# Patient Record
Sex: FEMALE | Race: WHITE | NOT HISPANIC OR LATINO | Employment: PART TIME | URBAN - METROPOLITAN AREA
[De-identification: names, ages, dates, MRNs, and addresses within clinical notes are randomized per-mention and may not be internally consistent; named-entity substitution may affect disease eponyms.]

---

## 2017-01-26 ENCOUNTER — OFFICE VISIT (OUTPATIENT)
Dept: FAMILY MEDICINE | Facility: CLINIC | Age: 57
End: 2017-01-26
Payer: COMMERCIAL

## 2017-01-26 VITALS
SYSTOLIC BLOOD PRESSURE: 122 MMHG | HEART RATE: 55 BPM | TEMPERATURE: 97.9 F | OXYGEN SATURATION: 99 % | BODY MASS INDEX: 25.07 KG/M2 | WEIGHT: 136.25 LBS | DIASTOLIC BLOOD PRESSURE: 72 MMHG | HEIGHT: 62 IN

## 2017-01-26 DIAGNOSIS — M75.42 IMPINGEMENT SYNDROME OF LEFT SHOULDER: Primary | ICD-10-CM

## 2017-01-26 PROCEDURE — 20610 DRAIN/INJ JOINT/BURSA W/O US: CPT | Mod: LT | Performed by: PHYSICIAN ASSISTANT

## 2017-01-26 PROCEDURE — 99213 OFFICE O/P EST LOW 20 MIN: CPT | Mod: 25 | Performed by: PHYSICIAN ASSISTANT

## 2017-01-26 RX ORDER — TRIAMCINOLONE ACETONIDE 40 MG/ML
80 INJECTION, SUSPENSION INTRA-ARTICULAR; INTRAMUSCULAR ONCE
Qty: 2 ML | Refills: 0 | OUTPATIENT
Start: 2017-01-26 | End: 2017-01-26

## 2017-01-26 NOTE — PROGRESS NOTES
SUBJECTIVE:                                                    Tawana Watson is a 56 year old female who presents to clinic today for the following health issues:    Should Pain  Patient presents to clinic today with chief complaint of left shoulder pain and possible injection. She has PMH significant for bilateral shoulder pain. Last seen for right frozen shoulder pain on 3/31/16 and she received an injection at this time. Today she complains onset of left shoulder pain was in mid-2016. Describes pain on lateral and anterior left shoulder. She feels that pain is similar to pain she felt in last March. Has followed with PT in the past and for current pain has continued to do at home exercises to improve shoulder strength.       Problem list and histories reviewed & adjusted, as indicated.  Additional history: as documented    Patient Active Problem List   Diagnosis     CARDIOVASCULAR SCREENING; LDL GOAL LESS THAN 160     Diverticulosis of colon     Advanced directives, counseling/discussion     Goiter     Family history of pancreatic cancer     Past Surgical History   Procedure Laterality Date     Surgical history of -        Esophagram - difficulty swallowing      section  87, 90     Phlebectomy multiple stab Left 10/15/96     varicosity - painful     Tubal ligation       s/p TL     Hc reduction of large breast  2006     Hysterectomy, supracervical laparoscopic       fibroid - ovaries spared     Hc colonoscopy thru stoma, diagnostic  2010     no polyps repeat 10       Social History   Substance Use Topics     Smoking status: Former Smoker -- 1.50 packs/day for 10 years     Types: Cigarettes     Quit date: 01/15/1986     Smokeless tobacco: Not on file     Alcohol Use: Yes      Comment: 1-2 beers a week     Family History   Problem Relation Age of Onset     Pancreatic Cancer Mother      pancreatic, +67     Breast Cancer No family hx of      Cancer - colorectal No family  "hx of      C.A.D. No family hx of      DIABETES Father       at 40     Hypertension Mother          Current Outpatient Prescriptions   Medication Sig Dispense Refill     IBUPROFEN PO Take 200 mg by mouth 4 times daily       No Known Allergies    ROS:  Constitutional, HEENT, cardiovascular, pulmonary, GI, , musculoskeletal, neuro, skin, endocrine and psych systems are negative, except as otherwise noted.    This document serves as a record of the services and decisions personally performed and made by Oksana Rubalcava PA-C. It was created on her behalf by Shoshana Schwartz, a trained medical scribe. The creation of this document is based the provider's statements to the medical scribe.  Shoshana Schwartz, 2017 11:32 AM    OBJECTIVE:                                                    /72 mmHg  Pulse 55  Temp(Src) 97.9  F (36.6  C) (Tympanic)  Ht 5' 1.75\" (1.568 m)  Wt 136 lb 4 oz (61.803 kg)  BMI 25.14 kg/m2  SpO2 99%  LMP 2008  Body mass index is 25.14 kg/(m^2).     GENERAL: healthy, alert and no distress  RESP: lungs clear to auscultation - no rales, rhonchi or wheezes  CV: regular rate and rhythm, normal S1 S2, no S3 or S4, no murmur, click or rub, no peripheral edema and peripheral pulses strong  MS: lacking 5 degrees of forward flexion on left, lacking 15 degrees of external rotation on left, lacking 5 degrees internal rotation on left, positive webb-jr on left, 5/5 strength throughout bilaterally  NEURO: Normal strength and tone, mentation intact and speech normal  PSYCH: mentation appears normal, affect normal/bright    Procedure:  After discussing the risks, benefits and alternatives to a left subacromial cortisone injection the patient elected to proceed with the injection.  The posterior aspect of the left shoulder was prepped with a betadine solution.  Using a sterile technique and a 22 gauge needle, 4 cc's of 1% lidocaine, 4 cc's of 0.25% marcaine, and 80 mg in Kenalog " were injected into the left subacromial.  The patient tolerated the procedure well and there were no immediate adverse effects.        Diagnostic Test Results:  none      ASSESSMENT/PLAN:                                                    Tawana was seen today for shoulder pain.    Diagnoses and all orders for this visit:    Impingement syndrome of left shoulder  Patient stable and doing well after injection. Counseled patient on at home exercises and provided handout for patient to improve shoulder strength. Discussed with patient that if pain returns further imaging will likely be necessary.   -     Large Joint/Bursa injection and/or drainage (Shoulder, Knee)  -     Kenalog  80 MG         []  -     triamcinolone acetonide (KENALOG) 40 MG/ML injection; 2 mLs (80 mg) by INTRA-ARTICULAR route once for 1 dose    The information in this document, created by the medical scribe for me, accurately reflects the services I personally performed and the decisions made by me. I have reviewed and approved this document for accuracy prior to leaving the patient care area.  Oksana Rubalcava PA-C January 26, 2017 11:31 AM    Oksana Rubalcava PA-C  [facility]

## 2017-01-26 NOTE — PATIENT INSTRUCTIONS
Shoulder Bursitis         What is shoulder bursitis?   Shoulder bursitis is an irritation or inflammation of the bursa in your shoulder. A bursa is a fluid-filled sac that acts as a cushion between tendons, bones, and skin.   How does it occur?   The shoulder bursa can get inflamed from repetitive motion of the shoulder. Shoulder bursitis often occurs in sports with overhead activities such as swimming, tennis, or throwing. It may also occur if you do carpentry work or painting.   What are the symptoms?   Symptoms include pain on the outer front side of your shoulder. Your shoulder may hurt when you lift your arm above your head. The outer side of your shoulder may become swollen and may at times feel warm.   How is it diagnosed?   Your healthcare provider will review your symptoms and examine your shoulder.   How is it treated?   To treat this condition:   Put an ice pack, gel pack, or package of frozen vegetables, wrapped in a cloth on the area every 3 to 4 hours, for up to 20 minutes at a time.   Take an anti-inflammatory such as ibuprofen, or other medicine as directed by your provider. Nonsteroidal anti-inflammatory medicines (NSAIDs) may cause stomach bleeding and other problems. These risks increase with age. Read the label and take as directed. Unless recommended by your healthcare provider, do not take for more than 10 days.   Your provider may give you a shot of a corticosteroid medicine into your shoulder bursa to help with pain and swelling   Follow your provider's instructions for doing exercises to help you recover.   How long will the effects last?   The length of recovery depends on your age, health, and if you have had a previous injury. A bursa that is only mildly inflamed and has just started to hurt may improve within a few weeks. A bursa that is significantly inflamed and has been painful for a long time may take up to a few months to improve. You need to stop doing the activities  that cause pain until your shoulder has healed. If you continue doing activities that cause pain, your symptoms will return and it will take longer to recover.   When can I return to my normal activities?   Everyone recovers from an injury at a different rate. Return to your activities will be determined by how soon your shoulder recovers, not by how many days or weeks it has been since your injury has occurred. In general, the longer you have symptoms before you start treatment, the longer it will take to get better. The goal is to return to your normal activities as soon as is safely possible. If you return too soon you may worsen your injury.   You may safely return to your activities when:   Your injured shoulder has full range of motion without pain.   Your injured shoulder has regained normal strength compared to the uninjured shoulder.   How can I prevent shoulder bursitis?   Be sure to warm up properly and stretch your shoulder before such activities as throwing, playing tennis, or swimming. If your shoulder starts to hurt during these activities, you may need to slow down until the pain goes away.        Published by [employer].  This content is reviewed periodically and is subject to change as new health information becomes available. The information is intended to inform and educate and is not a replacement for medical evaluation, advice, diagnosis or treatment by a healthcare professional.   Written by Chino Ackerman MD, for [employer].   ? 2010 [employer] and/or its affiliates. All Rights Reserved.   Copyright   [employer] 2011

## 2017-01-26 NOTE — MR AVS SNAPSHOT
After Visit Summary   1/26/2017    Tawana Watson    MRN: [id number]           Patient Information     Date Of Birth          1960        Visit Information        Provider Department      1/26/2017 11:20 AM Oksana Rubalcava PA-C [facility] [place]        Today's Diagnoses     Impingement syndrome of left shoulder    -  1       Care Instructions                  Shoulder Bursitis         What is shoulder bursitis?   Shoulder bursitis is an irritation or inflammation of the bursa in your shoulder. A bursa is a fluid-filled sac that acts as a cushion between tendons, bones, and skin.   How does it occur?   The shoulder bursa can get inflamed from repetitive motion of the shoulder. Shoulder bursitis often occurs in sports with overhead activities such as swimming, tennis, or throwing. It may also occur if you do carpentry work or painting.   What are the symptoms?   Symptoms include pain on the outer front side of your shoulder. Your shoulder may hurt when you lift your arm above your head. The outer side of your shoulder may become swollen and may at times feel warm.   How is it diagnosed?   Your healthcare provider will review your symptoms and examine your shoulder.   How is it treated?   To treat this condition:   Put an ice pack, gel pack, or package of frozen vegetables, wrapped in a cloth on the area every 3 to 4 hours, for up to 20 minutes at a time.   Take an anti-inflammatory such as ibuprofen, or other medicine as directed by your provider. Nonsteroidal anti-inflammatory medicines (NSAIDs) may cause stomach bleeding and other problems. These risks increase with age. Read the label and take as directed. Unless recommended by your healthcare provider, do not take for more than 10 days.   Your provider may give you a shot of a corticosteroid medicine into your shoulder bursa to help with pain and swelling   Follow your provider's instructions for doing exercises to help you  recover.   How long will the effects last?   The length of recovery depends on your age, health, and if you have had a previous injury. A bursa that is only mildly inflamed and has just started to hurt may improve within a few weeks. A bursa that is significantly inflamed and has been painful for a long time may take up to a few months to improve. You need to stop doing the activities that cause pain until your shoulder has healed. If you continue doing activities that cause pain, your symptoms will return and it will take longer to recover.   When can I return to my normal activities?   Everyone recovers from an injury at a different rate. Return to your activities will be determined by how soon your shoulder recovers, not by how many days or weeks it has been since your injury has occurred. In general, the longer you have symptoms before you start treatment, the longer it will take to get better. The goal is to return to your normal activities as soon as is safely possible. If you return too soon you may worsen your injury.   You may safely return to your activities when:   Your injured shoulder has full range of motion without pain.   Your injured shoulder has regained normal strength compared to the uninjured shoulder.   How can I prevent shoulder bursitis?   Be sure to warm up properly and stretch your shoulder before such activities as throwing, playing tennis, or swimming. If your shoulder starts to hurt during these activities, you may need to slow down until the pain goes away.        Published by [employer].  This content is reviewed periodically and is subject to change as new health information becomes available. The information is intended to inform and educate and is not a replacement for medical evaluation, advice, diagnosis or treatment by a healthcare professional.   Written by Chino Ackerman MD, for [employer].   ? 2010 [employer] and/or its affiliates. All Rights Reserved.   Copyright    "[employer] 2011              Follow-ups after your visit        Who to contact     If you have questions or need follow up information about today's clinic visit or your schedule please contact [facility] directly at [phone].  Normal or non-critical lab and imaging results will be communicated to you by MyChart, letter or phone within 4 business days after the clinic has received the results. If you do not hear from us within 7 days, please contact the clinic through [employer] or phone. If you have a critical or abnormal lab result, we will notify you by phone as soon as possible.  Submit refill requests through [employer] or call your pharmacy and they will forward the refill request to us. Please allow 3 business days for your refill to be completed.          Additional Information About Your Visit        [employer] Information     [employer] gives you secure access to your electronic health record. If you see a primary care provider, you can also send messages to your care team and make appointments. If you have questions, please call your primary care clinic.  If you do not have a primary care provider, please call [phone] and they will assist you.        Care EveryWhere ID     This is your Care EveryWhere ID. This could be used by other organizations to access your [place] medical records  [id number]        Your Vitals Were     Pulse Temperature Height BMI (Body Mass Index) Pulse Oximetry Last Period    55 97.9  F (36.6  C) (Tympanic) 5' 1.75\" (1.568 m) 25.14 kg/m2 99% 11/01/2008       Blood Pressure from Last 3 Encounters:   01/26/17 122/72   12/08/16 100/64   03/31/16 121/79    Weight from Last 3 Encounters:   01/26/17 136 lb 4 oz (61.803 kg)   12/08/16 132 lb 8 oz (60.102 kg)   03/31/16 160 lb (72.576 kg)              We Performed the Following     Kenalog  80 MG         []     Large Joint/Bursa injection and/or drainage (Shoulder, Knee)          Today's Medication " Changes          These changes are accurate as of: 1/26/17 11:38 AM.  If you have any questions, ask your nurse or doctor.               Start taking these medicines.        Dose/Directions    triamcinolone acetonide 40 MG/ML injection   Commonly known as:  KENALOG   Used for:  Impingement syndrome of left shoulder        Dose:  80 mg   2 mLs (80 mg) by INTRA-ARTICULAR route once for 1 dose   Quantity:  2 mL   Refills:  0            Where to get your medicines      Some of these will need a paper prescription and others can be bought over the counter.  Ask your nurse if you have questions.     You don't need a prescription for these medications    - triamcinolone acetonide 40 MG/ML injection             Primary Care Provider Office Phone # Fax #    Oksana Rubalcava PA-C [phone]       [address] [address]        Thank you!     Thank you for choosing [facility]  for your care. Our goal is always to provide you with excellent care. Hearing back from our patients is one way we can continue to improve our services. Please take a few minutes to complete the written survey that you may receive in the mail after your visit with us. Thank you!             Your Updated Medication List - Protect others around you: Learn how to safely use, store and throw away your medicines at www.disposemymeds.org.          This list is accurate as of: 1/26/17 11:38 AM.  Always use your most recent med list.                   Brand Name Dispense Instructions for use    IBUPROFEN PO      Take 200 mg by mouth 4 times daily       triamcinolone acetonide 40 MG/ML injection    KENALOG    2 mL    2 mLs (80 mg) by INTRA-ARTICULAR route once for 1 dose

## 2017-04-24 ENCOUNTER — MYC MEDICAL ADVICE (OUTPATIENT)
Dept: FAMILY MEDICINE | Facility: CLINIC | Age: 57
End: 2017-04-24

## 2017-04-24 DIAGNOSIS — G89.29 CHRONIC LEFT SHOULDER PAIN: Primary | ICD-10-CM

## 2017-04-24 DIAGNOSIS — M25.512 CHRONIC LEFT SHOULDER PAIN: Primary | ICD-10-CM

## 2017-04-25 NOTE — TELEPHONE ENCOUNTER
Please see My Chart message below and advise as appropriate.    Lary Harry RN  [employer]

## 2017-04-28 ENCOUNTER — TRANSFERRED RECORDS (OUTPATIENT)
Dept: HEALTH INFORMATION MANAGEMENT | Facility: CLINIC | Age: 57
End: 2017-04-28

## 2017-05-02 ENCOUNTER — OFFICE VISIT (OUTPATIENT)
Dept: FAMILY MEDICINE | Facility: CLINIC | Age: 57
End: 2017-05-02
Payer: COMMERCIAL

## 2017-05-02 VITALS
BODY MASS INDEX: 25.99 KG/M2 | WEIGHT: 141.25 LBS | HEIGHT: 62 IN | SYSTOLIC BLOOD PRESSURE: 112 MMHG | OXYGEN SATURATION: 99 % | TEMPERATURE: 98.9 F | DIASTOLIC BLOOD PRESSURE: 72 MMHG | HEART RATE: 82 BPM

## 2017-05-02 DIAGNOSIS — M75.22 BICEPS TENDONITIS ON LEFT: ICD-10-CM

## 2017-05-02 DIAGNOSIS — M75.02 ADHESIVE CAPSULITIS OF LEFT SHOULDER: Primary | ICD-10-CM

## 2017-05-02 DIAGNOSIS — M19.019 ACROMIOCLAVICULAR JOINT ARTHRITIS: ICD-10-CM

## 2017-05-02 DIAGNOSIS — M75.112 INCOMPLETE TEAR OF LEFT ROTATOR CUFF: ICD-10-CM

## 2017-05-02 PROCEDURE — 20610 DRAIN/INJ JOINT/BURSA W/O US: CPT | Mod: LT | Performed by: PHYSICIAN ASSISTANT

## 2017-05-02 PROCEDURE — 99213 OFFICE O/P EST LOW 20 MIN: CPT | Mod: 25 | Performed by: PHYSICIAN ASSISTANT

## 2017-05-02 RX ORDER — TRIAMCINOLONE ACETONIDE 40 MG/ML
80 INJECTION, SUSPENSION INTRA-ARTICULAR; INTRAMUSCULAR ONCE
Qty: 2 ML | Refills: 0 | OUTPATIENT
Start: 2017-05-02 | End: 2017-05-02

## 2017-05-02 NOTE — MR AVS SNAPSHOT
After Visit Summary   5/2/2017    Tawana Watson    MRN: [id number]           Patient Information     Date Of Birth          1960        Visit Information        Provider Department      5/2/2017 1:20 PM Oksana Rubalcava PA-C [facility]        Today's Diagnoses     Adhesive capsulitis of left shoulder    -  1    Incomplete tear of left rotator cuff        Biceps tendonitis on left        Acromioclavicular joint arthritis          Care Instructions      Frozen Shoulder (Adhesive Capsulitis)    Frozen shoulder is when pain and stiffness make it difficult to move your shoulder normally. This condition is also called adhesive capsulitis.  The shoulder is a ball-and-socket joint. The ball on the upper arm bone fits into a socket on the shoulder blade. The joint is covered by strong connective tissue called the shoulder capsule.  If the shoulder capsule becomes inflamed and swollen, movement is painful. Bands of scar tissue form on the joint s surface. This limits your shoulder's range of motion.  In most cases, only one shoulder at a time is affected. Some people may get frozen shoulder in the other shoulder, at a later time.  Frozen shoulder develops slowly in 3 stages. Each stage can last a few months or longer:  1. Painful stage. Pain occurs when raising your arm up or to the side, or when reaching behind your back. Your range of motion decreases. The pain may be worse at night and keep you from sleeping.  2. Frozen stage. Shoulder may be less painful, but it is stiffer. Range of motion is very limited.  3. Thawing stage. Range of motion starts to improve with treatment.  Experts don t know what causes frozen shoulder. It may occur after an injury such as a fracture. Or it may happen if the shoulder is immobile for a long time, such as after surgery. It may also be an autoimmune response. Risk factors include being over age 40, or having diabetes, heart disease, lung  disease, or an overactive thyroid.  The diagnosis is made by physical exam and an X-ray of the shoulder joint. Sometimes an MRI is done to look for other causes.  Mild cases may be treated with a home exercise program and anti-inflammatory medicines. More severe cases require physical therapy. In some cases a steroid is shot, or injected, into the shoulder area. In hard to treat cases, forced movement of the shoulder is done while you are asleep under general anesthesia. This will break up scar tissue and increase your range of motion. In rare cases, surgery may be needed to remove the scar tissue. Over time, most people with frozen shoulder get back nearly all range of motion without pain. Recovery may take a few months up to 1 year. You may still feel some stiffness.  Home care    If physical therapy was prescribed, keep up with any home exercise program you were given.    Keep using the affected shoulder and arm as much as possible.    You may use over-the-counter pain medicine to control pain, unless another pain medicine was prescribed. Anti-inflammatory pain medicines may work better than acetaminophen. If you have chronic liver or kidney disease or ever had a stomach ulcer or GI bleeding, talk with your healthcare provider before using these medicines.  Follow-up care  Follow up with your healthcare provider, or as advised. Or follow up sooner if pain increases or your shoulder motion decreases.  If X-rays or an MRI were done, you will be told of any new findings that may affect your care.  When to seek medical advice  Call your healthcare provider right away if any of these occur:    Your shoulder is red or swollen    Your arm feels weak or numb    Your shoulder movement decreases    Your shoulder pain increases even after using pain medicines    1725-4172 [employer]. [address] [place] [address], [place] [address]. All rights reserved. This information is not intended as a substitute for  professional medical care. Always follow your healthcare professional's instructions.              Follow-ups after your visit        Additional Services     ORTHOPEDICS ADULT REFERRAL       Your provider has referred you to:   [facility] - Dr. Fairbanks or Dr. Monique [phone]    Please be aware that coverage of these services is subject to the terms and limitations of your health insurance plan.  Call member services at your health plan with any benefit or coverage questions.      Please bring the following to your appointment:    >>   Any x-rays, CTs or MRIs which have been performed.  Contact the facility where they were done to arrange for  prior to your scheduled appointment.    >>   List of current medications   >>   This referral request   >>   Any documents/labs given to you for this referral            PHYSICAL THERAPY REFERRAL       Colleen Cheng, PT -  [facility]  Physical Therapy Treatment Note    Treatment: Evaluation & Treatment  Special Instructions/Modalities: Joint mobilization  Special Programs: None    Please be aware that coverage of these services is subject to the terms and limitations of your health insurance plan.  Call member services at your health plan with any benefit or coverage questions.                  Who to contact     If you have questions or need follow up information about today's clinic visit or your schedule please contact [facility] directly at [phone].  Normal or non-critical lab and imaging results will be communicated to you by MyChart, letter or phone within 4 business days after the clinic has received the results. If you do not hear from us within 7 days, please contact the clinic through MyChart or phone. If you have a critical or abnormal lab result, we will notify you by phone as soon as possible.  Submit refill requests through [employer] or call your pharmacy and they will  "forward the refill request to us. Please allow 3 business days for your refill to be completed.          Additional Information About Your Visit        [employer] Information     Easiest Credit Card To Get Approved For gives you secure access to your electronic health record. If you see a primary care provider, you can also send messages to your care team and make appointments. If you have questions, please call your primary care clinic.  If you do not have a primary care provider, please call [phone] and they will assist you.        Care EveryWhere ID     This is your Care EveryWhere ID. This could be used by other organizations to access your [facility] medical records  [id number]        Your Vitals Were     Pulse Temperature Height Last Period Pulse Oximetry BMI (Body Mass Index)    82 98.9  F (37.2  C) (Tympanic) 5' 1.75\" (1.568 m) 11/01/2008 99% 26.04 kg/m2       Blood Pressure from Last 3 Encounters:   05/02/17 112/72   01/26/17 122/72   12/08/16 100/64    Weight from Last 3 Encounters:   05/02/17 141 lb 4 oz (64.1 kg)   01/26/17 136 lb 4 oz (61.8 kg)   12/08/16 132 lb 8 oz (60.1 kg)              We Performed the Following     Kenalog  80 MG         []     Large Joint/Bursa injection and/or drainage (Shoulder, Knee)     ORTHOPEDICS ADULT REFERRAL     PHYSICAL THERAPY REFERRAL          Today's Medication Changes          These changes are accurate as of: 5/2/17  1:51 PM.  If you have any questions, ask your nurse or doctor.               Start taking these medicines.        Dose/Directions    triamcinolone acetonide 40 MG/ML injection   Commonly known as:  KENALOG   Used for:  Adhesive capsulitis of left shoulder   Started by:  Oksana Rubalcava PA-C        Dose:  80 mg   2 mLs (80 mg) by INTRA-ARTICULAR route once for 1 dose   Quantity:  2 mL   Refills:  0            Where to get your medicines      Some of these will need a paper prescription and others can be bought over the counter.  Ask your nurse if you have questions.     You " don't need a prescription for these medications     triamcinolone acetonide 40 MG/ML injection                Primary Care Provider Office Phone # Fax #    Oksana Rubalcava PA-C [phone]       [address] [address]        Thank you!     Thank you for choosing [facility]  for your care. Our goal is always to provide you with excellent care. Hearing back from our patients is one way we can continue to improve our services. Please take a few minutes to complete the written survey that you may receive in the mail after your visit with us. Thank you!             Your Updated Medication List - Protect others around you: Learn how to safely use, store and throw away your medicines at www.disposemymeds.org.          This list is accurate as of: 5/2/17  1:51 PM.  Always use your most recent med list.                   Brand Name Dispense Instructions for use    IBUPROFEN PO      Take 200 mg by mouth 4 times daily       triamcinolone acetonide 40 MG/ML injection    KENALOG    2 mL    2 mLs (80 mg) by INTRA-ARTICULAR route once for 1 dose

## 2017-05-02 NOTE — NURSING NOTE
"Chief Complaint   Patient presents with     Shoulder Pain       Initial /72  Pulse 82  Temp 98.9  F (37.2  C) (Tympanic)  Ht 5' 1.75\" (1.568 m)  Wt 141 lb 4 oz (64.1 kg)  LMP 11/01/2008  SpO2 99%  BMI 26.04 kg/m2 Estimated body mass index is 26.04 kg/(m^2) as calculated from the following:    Height as of this encounter: 5' 1.75\" (1.568 m).    Weight as of this encounter: 141 lb 4 oz (64.1 kg).  Medication Reconciliation: complete   Radha Jules, MARI      "

## 2017-05-02 NOTE — PROGRESS NOTES
"Tawana Watson is a 56 year old female who presents to the clinic today complaining of shoulder pain on the left side.      Onset of pain occurred 5 months ago.  The pain is achy and constant in lateral anterior left shoulder.  She was last seen for shoulder evaluation on 1/26/17 and subsequent left subacromial cortisone injection. Since pain was not improved with injection nor other conservative measures such as PT she was sent for left shoulder MR. Imagining done on 4/24/17 showed possible partial thickness rotator cuff tear, frozen shoulder thickening of the capsule, and inflammation of the bursa & biceps tendon. Recommended glenohumeral cortisone injection.     Associated symptoms include Weakness.    The patient has tried PT to improve symptoms.    Past Medical History:   Diagnosis Date     Diverticulosis of colon 2010    noted on colonoscopy     Family history of pancreatic cancer 12/8/2016     Frozen shoulder     x 2 - both responded to cortisone + PT     Goiter, unspecified 01/19/00     Leiomyoma of uterus, unspecified 4-2008     Other and unspecified ovarian cyst 4-2008       Current Outpatient Prescriptions:      triamcinolone acetonide (KENALOG) 40 MG/ML injection, 2 mLs (80 mg) by INTRA-ARTICULAR route once for 1 dose, Disp: 2 mL, Rfl: 0     IBUPROFEN PO, Take 200 mg by mouth 4 times daily, Disp: , Rfl:     This document serves as a record of the services and decisions personally performed and made by Oksana Rubalcava PA-C. It was created on her behalf by Shoshana Schwartz, a trained medical scribe. The creation of this document is based the provider's statements to the medical scribe.  Shoshana Schwartz, May 2, 2017 1:44 PM    EXAM:  Blood pressure 112/72, pulse 82, temperature 98.9  F (37.2  C), temperature source Tympanic, height 5' 1.75\" (1.568 m), weight 141 lb 4 oz (64.1 kg), last menstrual period 11/01/2008, SpO2 99 %, not currently breastfeeding.     Tawnaa appears comfortable, active, alert, and " oriented x 3.  left SHOULDER EXAM:Pain with internal rotation  Pain with external rotation  Pain with flexion  Pain with bicep insertion  Positive Hawkin's Cory  EXT: pulses intact. Sensation to light touch intact.    Diagnostic studies:  EXAM: MRI EXAMINATION OF THE LEFT SHOULDER 4/28/17  CLINICAL INFORMATION: Chronic left shoulder pain. No specific injury described. There is no history of surgery to this area. Unresponsive to conservative treatment.  TECHNICAL INFORMATION: 3 mm proton density and T2-weighted oblique coronal and sagittal images were obtained, in addition to 3 mm proton density T2-weighted axial images and 4 mm oblique coronal STIR images.   There are no prior studies available for comparison.  INTERPRETATION:  Bones: There is no Hill-Sachs impaction deformity. No other evidence for fracture.  Rotator Cuff: There is an abnormal appearance as seen on series 9 image 21 as well as series 7 images 9 through 11 of an approximate 0.8 cm AP by 0.9 cm mediolateral segment of deep fiber fraying or shallow partial-thickness tearing of the anterior supraspinatus tendon.   The infraspinatus tendon is intact without tear or significant tendinopathy. The teres minor tendon is intact. The subscapularis tendon is intact. No appreciable rotator cuff muscle belly atrophy.   Coracoacromial arch: There is no discrete subacromial osseous spur. The subacromial space is measuring 5 to 6 mm.   Acromioclavicular joint: Mild AC joint DJD. No deformity of the underlying supraspinatus tendon. Minimal fluid and edema signal within the subacromial/subdeltoid bursa areas.   Biceps tendon: Long head biceps tendon is nondisplaced from the bicipital groove with a mild appearance of intra-articular tendinopathy.   Glenohumeral joint and labrum: There is a small to moderate glenohumeral joint effusion. Mild synovitis within the joint. Osteochondral surfaces appear relatively preserved. No discrete SLAP tear. No other definite  evidence for labral tear. No evidence for a paralabral ganglion cyst.   There is abnormal increased signal intensity and thickening of the inferior glenohumeral capsule. Minimal changes of soft tissue edema signal along the capsule as well.   CONCLUSION:   1. MRI findings are in keeping with adhesive capsulitis.  2. Small segment of deep fiber fraying or shallow partial-thickness tearing involves the anterior supraspinatus tendon.  3. Mild AC joint DJD with mild narrowing of the subacromial space. Minimal subacromial/subdeltoid bursitis.  4. Mild intra-articular long biceps tendinopathy.  5. No evidence for glenohumeral osteoarthritis. No definite labral tearing.      Procedure:  After discussing the risks, benefits and alternatives to a left glenohumeral cortisone injection the patient elected to proceed with the injection.  The posterior aspect of the left shoulder was prepped with a betadine solution.  Using a sterile technique and a 22 gauge needle, 4 cc's of 1% lidocaine, 4 cc's of 0.25% marcaine, and 80 mg in Kenalog were injected into the left glenohumeral.  The patient tolerated the procedure well and there were no immediate adverse effects.        Tawana was seen today for shoulder pain.    Diagnoses and all orders for this visit:    Adhesive capsulitis of left shoulder, Incomplete tear of left rotator cuff, Biceps tendonitis on left, Acromioclavicular joint arthritis  Patient is stable and doing well after injection. Counseled patient on at home exercises and provided handout. Discussed with patient that a few sessions of PT may also be beneficial. Recommended patient establish with ortho due to chronic bilateral shoulder pain.  -     ORTHOPEDICS ADULT REFERRAL - referred to Dr. Fairbanks or Ene[place] if no improvement with PHYSICAL THERAPY  -     Large Joint/Bursa injection and/or drainage (Shoulder, Knee)  -     Kenalog  80 MG         []  -     triamcinolone acetonide (KENALOG) 40 MG/ML injection; 2  mLs (80 mg) by INTRA-ARTICULAR route once for 1 dose  -      PHYSICAL THERAPY REFERRAL - patient desires to return to [place]    The information in this document, created by the medical scribe for me, accurately reflects the services I personally performed and the decisions made by me. I have reviewed and approved this document for accuracy prior to leaving the patient care area .  Oksana Glez PA-C May 2, 2017 1:45 PM    OKSANA GLEZ  [facility] [place]

## 2017-05-02 NOTE — PATIENT INSTRUCTIONS
Frozen Shoulder (Adhesive Capsulitis)    Frozen shoulder is when pain and stiffness make it difficult to move your shoulder normally. This condition is also called adhesive capsulitis.  The shoulder is a ball-and-socket joint. The ball on the upper arm bone fits into a socket on the shoulder blade. The joint is covered by strong connective tissue called the shoulder capsule.  If the shoulder capsule becomes inflamed and swollen, movement is painful. Bands of scar tissue form on the joint s surface. This limits your shoulder's range of motion.  In most cases, only one shoulder at a time is affected. Some people may get frozen shoulder in the other shoulder, at a later time.  Frozen shoulder develops slowly in 3 stages. Each stage can last a few months or longer:  1. Painful stage. Pain occurs when raising your arm up or to the side, or when reaching behind your back. Your range of motion decreases. The pain may be worse at night and keep you from sleeping.  2. Frozen stage. Shoulder may be less painful, but it is stiffer. Range of motion is very limited.  3. Thawing stage. Range of motion starts to improve with treatment.  Experts don t know what causes frozen shoulder. It may occur after an injury such as a fracture. Or it may happen if the shoulder is immobile for a long time, such as after surgery. It may also be an autoimmune response. Risk factors include being over age 40, or having diabetes, heart disease, lung disease, or an overactive thyroid.  The diagnosis is made by physical exam and an X-ray of the shoulder joint. Sometimes an MRI is done to look for other causes.  Mild cases may be treated with a home exercise program and anti-inflammatory medicines. More severe cases require physical therapy. In some cases a steroid is shot, or injected, into the shoulder area. In hard to treat cases, forced movement of the shoulder is done while you are asleep under general anesthesia. This will break up scar tissue  and increase your range of motion. In rare cases, surgery may be needed to remove the scar tissue. Over time, most people with frozen shoulder get back nearly all range of motion without pain. Recovery may take a few months up to 1 year. You may still feel some stiffness.  Home care    If physical therapy was prescribed, keep up with any home exercise program you were given.    Keep using the affected shoulder and arm as much as possible.    You may use over-the-counter pain medicine to control pain, unless another pain medicine was prescribed. Anti-inflammatory pain medicines may work better than acetaminophen. If you have chronic liver or kidney disease or ever had a stomach ulcer or GI bleeding, talk with your healthcare provider before using these medicines.  Follow-up care  Follow up with your healthcare provider, or as advised. Or follow up sooner if pain increases or your shoulder motion decreases.  If X-rays or an MRI were done, you will be told of any new findings that may affect your care.  When to seek medical advice  Call your healthcare provider right away if any of these occur:    Your shoulder is red or swollen    Your arm feels weak or numb    Your shoulder movement decreases    Your shoulder pain increases even after using pain medicines    2881-4828 [employer]. [address] [place] [address], [place] [address]. All rights reserved. This information is not intended as a substitute for professional medical care. Always follow your healthcare professional's instructions.

## 2017-11-30 ENCOUNTER — HOSPITAL ENCOUNTER (OUTPATIENT)
Dept: MAMMOGRAPHY | Facility: CLINIC | Age: 57
Discharge: HOME | End: 2017-11-30
Attending: PHYSICIAN ASSISTANT | Admitting: PHYSICIAN ASSISTANT
Payer: COMMERCIAL

## 2017-11-30 DIAGNOSIS — Z12.31 VISIT FOR SCREENING MAMMOGRAM: ICD-10-CM

## 2017-11-30 PROCEDURE — 77063 BREAST TOMOSYNTHESIS BI: CPT

## 2017-11-30 NOTE — PROGRESS NOTES
Tawana  Here are your recent results.  They are normal.  If you have any questions please do not hesitate to contact our office via phone ([phone]) or MyChart.    Oksana Rubalcava MS, PA-C  [facility]

## 2018-04-19 ENCOUNTER — OFFICE VISIT (OUTPATIENT)
Dept: FAMILY MEDICINE | Facility: CLINIC | Age: 58
End: 2018-04-19
Payer: COMMERCIAL

## 2018-04-19 VITALS
TEMPERATURE: 97.9 F | BODY MASS INDEX: 23.23 KG/M2 | SYSTOLIC BLOOD PRESSURE: 110 MMHG | WEIGHT: 126.25 LBS | OXYGEN SATURATION: 100 % | DIASTOLIC BLOOD PRESSURE: 70 MMHG | HEIGHT: 62 IN | HEART RATE: 66 BPM

## 2018-04-19 DIAGNOSIS — Z13.1 SCREENING FOR DIABETES MELLITUS: ICD-10-CM

## 2018-04-19 DIAGNOSIS — Z23 NEED FOR TDAP VACCINATION: ICD-10-CM

## 2018-04-19 DIAGNOSIS — Z13.220 LIPID SCREENING: ICD-10-CM

## 2018-04-19 DIAGNOSIS — Z80.0 FAMILY HISTORY OF PANCREATIC CANCER: ICD-10-CM

## 2018-04-19 DIAGNOSIS — E04.9 GOITER: ICD-10-CM

## 2018-04-19 DIAGNOSIS — Z13.0 SCREENING FOR DEFICIENCY ANEMIA: ICD-10-CM

## 2018-04-19 DIAGNOSIS — Z11.4 SCREENING FOR HUMAN IMMUNODEFICIENCY VIRUS: ICD-10-CM

## 2018-04-19 DIAGNOSIS — Z00.00 ROUTINE GENERAL MEDICAL EXAMINATION AT A HEALTH CARE FACILITY: Primary | ICD-10-CM

## 2018-04-19 DIAGNOSIS — M75.02 ADHESIVE CAPSULITIS OF LEFT SHOULDER: ICD-10-CM

## 2018-04-19 LAB
ALBUMIN SERPL-MCNC: 4.1 G/DL (ref 3.4–5)
ALP SERPL-CCNC: 75 U/L (ref 40–150)
ALT SERPL W P-5'-P-CCNC: 22 U/L (ref 0–50)
ANION GAP SERPL CALCULATED.3IONS-SCNC: 3 MMOL/L (ref 3–14)
AST SERPL W P-5'-P-CCNC: 14 U/L (ref 0–45)
BILIRUB SERPL-MCNC: 1 MG/DL (ref 0.2–1.3)
BUN SERPL-MCNC: 14 MG/DL (ref 7–30)
CALCIUM SERPL-MCNC: 9.2 MG/DL (ref 8.5–10.1)
CHLORIDE SERPL-SCNC: 108 MMOL/L (ref 94–109)
CHOLEST SERPL-MCNC: 195 MG/DL
CO2 SERPL-SCNC: 31 MMOL/L (ref 20–32)
CREAT SERPL-MCNC: 0.7 MG/DL (ref 0.52–1.04)
ERYTHROCYTE [DISTWIDTH] IN BLOOD BY AUTOMATED COUNT: 13.2 % (ref 10–15)
GFR SERPL CREATININE-BSD FRML MDRD: 85 ML/MIN/1.7M2
GLUCOSE SERPL-MCNC: 86 MG/DL (ref 70–99)
HCT VFR BLD AUTO: 40.9 % (ref 35–47)
HDLC SERPL-MCNC: 64 MG/DL
HGB BLD-MCNC: 14 G/DL (ref 11.7–15.7)
HIV 1+2 AB+HIV1 P24 AG SERPL QL IA: NONREACTIVE
LDLC SERPL CALC-MCNC: 114 MG/DL
MCH RBC QN AUTO: 31.3 PG (ref 26.5–33)
MCHC RBC AUTO-ENTMCNC: 34.2 G/DL (ref 31.5–36.5)
MCV RBC AUTO: 91 FL (ref 78–100)
NONHDLC SERPL-MCNC: 131 MG/DL
PLATELET # BLD AUTO: 217 10E9/L (ref 150–450)
POTASSIUM SERPL-SCNC: 4 MMOL/L (ref 3.4–5.3)
PROT SERPL-MCNC: 7.4 G/DL (ref 6.8–8.8)
RBC # BLD AUTO: 4.48 10E12/L (ref 3.8–5.2)
SODIUM SERPL-SCNC: 142 MMOL/L (ref 133–144)
TRIGL SERPL-MCNC: 83 MG/DL
TSH SERPL DL<=0.005 MIU/L-ACNC: 2.47 MU/L (ref 0.4–4)
WBC # BLD AUTO: 5.2 10E9/L (ref 4–11)

## 2018-04-19 PROCEDURE — 85027 COMPLETE CBC AUTOMATED: CPT | Performed by: PHYSICIAN ASSISTANT

## 2018-04-19 PROCEDURE — 84443 ASSAY THYROID STIM HORMONE: CPT | Performed by: PHYSICIAN ASSISTANT

## 2018-04-19 PROCEDURE — 87389 HIV-1 AG W/HIV-1&-2 AB AG IA: CPT | Performed by: PHYSICIAN ASSISTANT

## 2018-04-19 PROCEDURE — 36415 COLL VENOUS BLD VENIPUNCTURE: CPT | Performed by: PHYSICIAN ASSISTANT

## 2018-04-19 PROCEDURE — 99396 PREV VISIT EST AGE 40-64: CPT | Performed by: PHYSICIAN ASSISTANT

## 2018-04-19 PROCEDURE — 80053 COMPREHEN METABOLIC PANEL: CPT | Performed by: PHYSICIAN ASSISTANT

## 2018-04-19 PROCEDURE — 80061 LIPID PANEL: CPT | Performed by: PHYSICIAN ASSISTANT

## 2018-04-19 PROCEDURE — 90715 TDAP VACCINE 7 YRS/> IM: CPT | Performed by: PHYSICIAN ASSISTANT

## 2018-04-19 NOTE — NURSING NOTE
"Chief Complaint   Patient presents with     Physical     fasting        Initial /70  Pulse 66  Temp 97.9  F (36.6  C) (Tympanic)  Ht 5' 1.75\" (1.568 m)  Wt 126 lb 4 oz (57.3 kg)  LMP 11/01/2008  SpO2 100%  BMI 23.28 kg/m2 Estimated body mass index is 23.28 kg/(m^2) as calculated from the following:    Height as of this encounter: 5' 1.75\" (1.568 m).    Weight as of this encounter: 126 lb 4 oz (57.3 kg).  Medication Reconciliation: complete    "

## 2018-04-19 NOTE — PROGRESS NOTES
SUBJECTIVE:   CC: Tawana Watson is an 57 year old woman who presents for preventive health visit.     Answers for HPI/ROS submitted by the patient on 4/18/2018   Annual Exam:  Getting at least 3 servings of Calcium per day:: Yes  Bi-annual eye exam:: NO  Dental care twice a year:: Yes  Sleep apnea or symptoms of sleep apnea:: None  Diet:: Regular (no restrictions)  Frequency of exercise:: 2-3 days/week  Taking medications regularly:: Not Applicable  Medication side effects:: Not applicable  Additional concerns today:: No  PHQ-2 Score: 0  Duration of exercise:: 30-45 minutes    [facility] [place]    Family History of Pancreatic Cancer - The patient has a family history of pancreatic cancer in her mother that started at age 67. She had a cancer antigen 19-9 completed on 12/8/2016 which was normal.     Adhesive Capsulitis - The patient reports that the mobility in her left shoulder has significantly improved. She was going to [facility] for physical therapy. She received cortisone injections on 5/2017, 11/2017, and 2/2018.    Colon Cancer Screening - The patient's last colonoscopy was on 9/24/2010 which was normal. She is due for a repeat colonoscopy in 2020.      Today's PHQ-2 Score:   PHQ-2 ( 1999 Pfizer) 4/18/2018 12/5/2016   Q1: Little interest or pleasure in doing things 0 -   Q2: Feeling down, depressed or hopeless 0 -   PHQ-2 Score 0 -   Q1: Little interest or pleasure in doing things Not at all Not at all   Q2: Feeling down, depressed or hopeless Not at all Not at all   PHQ-2 Score 0 0       Abuse: Current or Past(Physical, Sexual or Emotional)- No  Do you feel safe in your environment - Yes    Social History   Substance Use Topics     Smoking status: Former Smoker     Packs/day: 1.50     Years: 10.00     Types: Cigarettes     Quit date: 1/15/1986     Smokeless tobacco: Never Used     Alcohol use Yes      Comment: 1-2 beers a week     If you drink alcohol do you typically have >3 drinks per day or >7  "drinks per week? No                     Reviewed orders with patient.  Reviewed health maintenance and updated orders accordingly - Yes    Patient over age 50, mutual decision to screen reflected in health maintenance.    Pertinent mammograms are reviewed under the imaging tab.  History of abnormal Pap smear: NO - age 30- 65 PAP every 5 years recommended    Reviewed and updated as needed this visit by clinical staff  Tobacco  Allergies  Meds  Problems  Med Hx  Surg Hx  Fam Hx  Soc Hx          Reviewed and updated as needed this visit by Provider  Tobacco  Allergies  Meds  Problems  Med Hx  Surg Hx  Fam Hx  Soc Hx           ROS:  C: NEGATIVE for fever, chills, change in weight  I: NEGATIVE for worrisome rashes, moles or lesions  E: NEGATIVE for vision changes or irritation  ENT: NEGATIVE for ear, mouth and throat problems  R: NEGATIVE for significant cough or SOB  B: NEGATIVE for masses, tenderness or discharge  CV: NEGATIVE for chest pain, palpitations or peripheral edema  GI: NEGATIVE for nausea, abdominal pain, heartburn, or change in bowel habits  : NEGATIVE for unusual urinary or vaginal symptoms. No vaginal bleeding.  M: NEGATIVE for significant arthralgias or myalgia  N: NEGATIVE for weakness, dizziness or paresthesias  P: NEGATIVE for changes in mood or affect     This document serves as a record of the services and decisions personally performed and made by Oksana Rubalcava PA-C. It was created on his behalf by Devika Wall, a trained medical scribe. The creation of this document is based on the provider's statements to the medical scribe.  Devika Wall 7:54 AM 4/19/2018  OBJECTIVE:   /70  Pulse 66  Temp 97.9  F (36.6  C) (Tympanic)  Ht 5' 1.75\" (1.568 m)  Wt 126 lb 4 oz (57.3 kg)  LMP 11/01/2008  SpO2 100%  BMI 23.28 kg/m2  EXAM:  GENERAL APPEARANCE: healthy, alert and no distress  EYES: Eyes grossly normal to inspection, PERRL and conjunctivae and sclerae normal  HENT: " diffusely enlarged thyroid, otherwise ear canals and TM's normal, nose and mouth without ulcers or lesions, oropharynx clear and oral mucous membranes moist  NECK: no adenopathy, no asymmetry, masses, or scars and thyroid normal to palpation  RESP: lungs clear to auscultation - no rales, rhonchi or wheezes  BREAST: normal without masses, tenderness or nipple discharge and no palpable axillary masses or adenopathy  CV: regular rate and rhythm, normal S1 S2, no S3 or S4, no murmur, click or rub, no peripheral edema and peripheral pulses strong  ABDOMEN: soft, nontender, no hepatosplenomegaly, no masses and bowel sounds normal   (female): deferred  MS: no musculoskeletal defects are noted and gait is age appropriate without ataxia  SKIN: no suspicious lesions or rashes  NEURO: Normal strength and tone, sensory exam grossly normal, mentation intact and speech normal  PSYCH: mentation appears normal and affect normal/bright    Diagnostic Results:  Pending   ASSESSMENT/PLAN:   Tawana was seen today for physical.    Diagnoses and all orders for this visit:    Routine general medical examination at a health care facility - Fasting labs pending.     Goiter - continue to monitor.   -     TSH with free T4 reflex    Adhesive capsulitis of left shoulder - Resolved. Pt was seen by orthopedics and PT. She received 3x Kenalog injections.    Lipid screening  -     Lipid panel reflex to direct LDL Fasting    Screening for diabetes mellitus  -     Comprehensive metabolic panel    Screening for deficiency anemia  -     CBC with platelets    Family history of pancreatic cancer - Pt will schedule ultrasound of the abdomen due to family history of pancreatic cancer in her mother. Previous cancer antigen 19-9 was normal.  -     US Abdomen Complete; Future    Screening for human immunodeficiency virus  -     HIV Antigen Antibody Combo    Need for Tdap vaccination - Administered by MA in clinic today.  -     TDAP VACCINE  "(ADACEL)        COUNSELING:   Reviewed preventive health counseling, as reflected in patient instructions       Regular exercise       Healthy diet/nutrition       Vision screening       Hearing screening       Colon cancer screening       Consider Hep C screening for patients born between 1945 and 1965       HIV screeninx in teen years, 1x in adult years, and at intervals if high risk     reports that she quit smoking about 32 years ago. Her smoking use included Cigarettes. She has a 15.00 pack-year smoking history. She has never used smokeless tobacco.    Estimated body mass index is 23.28 kg/(m^2) as calculated from the following:    Height as of this encounter: 5' 1.75\" (1.568 m).    Weight as of this encounter: 126 lb 4 oz (57.3 kg).     Counseling Resources:  ATP IV Guidelines  Pooled Cohorts Equation Calculator  Breast Cancer Risk Calculator  FRAX Risk Assessment  ICSI Preventive Guidelines  Dietary Guidelines for Americans, 2010  USDA's MyPlate  ASA Prophylaxis  Lung CA Screening    The information in this document, created by a scribe for me, accurately reflects the services I personally performed and the decisions made by me. I have reviewed and approved this document for accuracy.    Oksana Rubalcava PA-C  "

## 2018-04-19 NOTE — MR AVS SNAPSHOT
After Visit Summary   4/19/2018    Tawana Watson    MRN: [id number]           Patient Information     Date Of Birth          1960        Visit Information        Provider Department      4/19/2018 7:40 AM Oksana Rubalcava PA-C [facility] [place]        Today's Diagnoses     Routine general medical examination at a health care facility    -  1    Goiter        Adhesive capsulitis of left shoulder        Need for prophylactic vaccination with tetanus-diphtheria (TD)        Lipid screening        Screening for diabetes mellitus        Screening for deficiency anemia        Family history of pancreatic cancer        Screening for human immunodeficiency virus        Need for Tdap vaccination          Care Instructions      Preventive Health Recommendations  Female Ages 50 - 64    Yearly exam: See your health care provider every year in order to  o Review health changes.   o Discuss preventive care.    o Review your medicines if your doctor has prescribed any.      Get a Pap test every three years (unless you have an abnormal result and your provider advises testing more often).    If you get Pap tests with HPV test, you only need to test every 5 years, unless you have an abnormal result.     You do not need a Pap test if your uterus was removed (hysterectomy) and you have not had cancer.    You should be tested each year for STDs (sexually transmitted diseases) if you're at risk.     Have a mammogram every 1 to 2 years.    Have a colonoscopy at age 50, or have a yearly FIT test (stool test). These exams screen for colon cancer.      Have a cholesterol test every 5 years, or more often if advised.    Have a diabetes test (fasting glucose) every three years. If you are at risk for diabetes, you should have this test more often.     If you are at risk for osteoporosis (brittle bone disease), think about having a bone density scan (DEXA).    Shots: Get a flu shot each year. Get a tetanus  shot every 10 years.    Nutrition:     Eat at least 5 servings of fruits and vegetables each day.    Eat whole-grain bread, whole-wheat pasta and brown rice instead of white grains and rice.    Talk to your provider about Calcium and Vitamin D.     Lifestyle    Exercise at least 150 minutes a week (30 minutes a day, 5 days a week). This will help you control your weight and prevent disease.    Limit alcohol to one drink per day.    No smoking.     Wear sunscreen to prevent skin cancer.     See your dentist every six months for an exam and cleaning.    See your eye doctor every 1 to 2 years.            Follow-ups after your visit        Future tests that were ordered for you today     Open Future Orders        Priority Expected Expires Ordered    US Abdomen Complete Routine  4/19/2019 4/19/2018            Who to contact     If you have questions or need follow up information about today's clinic visit or your schedule please contact [facility] directly at [phone].  Normal or non-critical lab and imaging results will be communicated to you by [facility], letter or phone within 4 business days after the clinic has received the results. If you do not hear from us within 7 days, please contact the clinic through [employer] or phone. If you have a critical or abnormal lab result, we will notify you by phone as soon as possible.  Submit refill requests through [employer] or call your pharmacy and they will forward the refill request to us. Please allow 3 business days for your refill to be completed.          Additional Information About Your Visit        [employer] Information     [employer] gives you secure access to your electronic health record. If you see a primary care provider, you can also send messages to your care team and make appointments. If you have questions, please call your primary care clinic.  If you do not have a primary care provider, please call [phone] and they will assist you.       "  Care EveryWhere ID     This is your Care EveryWhere ID. This could be used by other organizations to access your [facility] medical records  [id number]        Your Vitals Were     Pulse Temperature Height Last Period Pulse Oximetry BMI (Body Mass Index)    66 97.9  F (36.6  C) (Tympanic) 5' 1.75\" (1.568 m) 11/01/2008 100% 23.28 kg/m2       Blood Pressure from Last 3 Encounters:   04/19/18 110/70   05/02/17 112/72   01/26/17 122/72    Weight from Last 3 Encounters:   04/19/18 126 lb 4 oz (57.3 kg)   05/02/17 141 lb 4 oz (64.1 kg)   01/26/17 136 lb 4 oz (61.8 kg)              We Performed the Following     CBC with platelets     Comprehensive metabolic panel     HIV Antigen Antibody Combo     Lipid panel reflex to direct LDL Fasting     TDAP VACCINE (ADACEL)     TSH with free T4 reflex        Primary Care Provider Office Phone # Fax #    Oksana Rubalcava PA-C [phone]       [address] [address]        Equal Access to Services     MATT PINO : Hadii aad ku hadasho Soomaali, waaxda luqadaha, qaybta kaalmada adeegyada, yasmin navas haynba martinez . So [facility] [phone].    ATENCIÓN: Si habla español, tiene a andersen disposición servicios gratuitos de asistencia lingüística. [facility] [phone].    We comply with applicable federal civil rights laws and [place] laws. We do not discriminate on the basis of race, color, national origin, age, disability, sex, sexual orientation, or gender identity.            Thank you!     Thank you for choosing [facility]  for your care. Our goal is always to provide you with excellent care. Hearing back from our patients is one way we can continue to improve our services. Please take a few minutes to complete the written survey that you may receive in the mail after your visit with us. Thank you!             Your Updated Medication List - Protect others around you: Learn how to safely use, " store and throw away your medicines at www.disposemymeds.org.          This list is accurate as of 4/19/18  8:18 AM.  Always use your most recent med list.                   Brand Name Dispense Instructions for use Diagnosis    IBUPROFEN PO      Take 200 mg by mouth 4 times daily

## 2018-04-21 NOTE — PROGRESS NOTES
I have reviewed your recent labs. Here are the results:    -Liver and gallbladder tests are normal. (ALT,AST, Alk phos, bilirubin), kidney function is normal (Cr, GFR), Sodium is normal, Potassium is normal, Calcium is normal, Glucose is normal (diabetes screening test).   -Cholesterol levels (LDL,HDL, Triglycerides) are normal. ADVISE: rechecking in 5 years.  -TSH (thyroid stimulating hormone) level is normal which indicates normal thyroid function.  -Normal red blood cell (hgb) levels, normal white blood cell count and normal platelet levels.  -HIV is negative (one time screening test)    For additional lab test information, labtestsonline.org is an excellent reference.      If you have any questions please do not hesitate to contact our office via phone ([phone]) or MyChart.    Oksana Rubalcava, MS, PA-C  [facility]

## 2018-04-26 ENCOUNTER — TRANSFERRED RECORDS (OUTPATIENT)
Dept: HEALTH INFORMATION MANAGEMENT | Facility: CLINIC | Age: 58
End: 2018-04-26

## 2018-04-30 ENCOUNTER — TELEPHONE (OUTPATIENT)
Dept: FAMILY MEDICINE | Facility: CLINIC | Age: 58
End: 2018-04-30

## 2018-04-30 NOTE — TELEPHONE ENCOUNTER
Please call patient and inform her that her abdominal ultrasound was normal (family history of pancreatic cancer).  We can repeat this in 2-3 years.      Oksana Rubalcava MS, PA-C

## 2018-04-30 NOTE — TELEPHONE ENCOUNTER
Attempt #1.  Joanna Lomeli contacted Tawana on 04/30/18 and left a message. If patient calls back please contact RN team.  Krystyna Lomeli RN  [facility]

## 2018-05-01 NOTE — TELEPHONE ENCOUNTER
Patient notified by phone.  She verbalized understanding and agreed with plan.    Tracy Miller, BS, RN, PHN  [facility]) [phone]

## 2018-09-28 NOTE — NURSING NOTE
"Chief Complaint   Patient presents with     Shoulder Pain     left shoulder injection        Initial /72 mmHg  Pulse 55  Temp(Src) 97.9  F (36.6  C) (Tympanic)  Ht 5' 1.75\" (1.568 m)  Wt 136 lb 4 oz (61.803 kg)  BMI 25.14 kg/m2  SpO2 99%  LMP 11/01/2008 Estimated body mass index is 25.14 kg/(m^2) as calculated from the following:    Height as of this encounter: 5' 1.75\" (1.568 m).    Weight as of this encounter: 136 lb 4 oz (61.803 kg).  BP completed using cuff size: wendy Jules CMA      " Wound care: clean with soap and water  Pack with wet to dry dressings  Secure with tape   Daily

## 2018-12-07 ENCOUNTER — HOSPITAL ENCOUNTER (OUTPATIENT)
Dept: MAMMOGRAPHY | Facility: CLINIC | Age: 58
Discharge: HOME | End: 2018-12-07
Attending: PHYSICIAN ASSISTANT | Admitting: PHYSICIAN ASSISTANT
Payer: COMMERCIAL

## 2018-12-07 DIAGNOSIS — Z12.31 VISIT FOR SCREENING MAMMOGRAM: ICD-10-CM

## 2018-12-07 PROCEDURE — 77063 BREAST TOMOSYNTHESIS BI: CPT

## 2018-12-07 NOTE — PROGRESS NOTES
Tawana  Here are your recent results.  They are normal.  If you have any questions please do not hesitate to contact our office via phone ([phone]) or MyChart.    Oksana Rubalcava MS, PA-C  [facility]

## 2019-09-29 ENCOUNTER — HEALTH MAINTENANCE LETTER (OUTPATIENT)
Age: 59
End: 2019-09-29

## 2019-11-06 ENCOUNTER — MYC MEDICAL ADVICE (OUTPATIENT)
Dept: FAMILY MEDICINE | Facility: CLINIC | Age: 59
End: 2019-11-06

## 2019-11-06 DIAGNOSIS — R20.0 HAND NUMBNESS: Primary | ICD-10-CM

## 2019-11-06 NOTE — TELEPHONE ENCOUNTER
Please see my chart message below     Would you like to see pt or send her on to a specialist?     Please advise     Thank you     Tarsha Salguero RN, BSN  [place] [facility]

## 2019-11-25 ENCOUNTER — TRANSFERRED RECORDS (OUTPATIENT)
Dept: HEALTH INFORMATION MANAGEMENT | Facility: CLINIC | Age: 59
End: 2019-11-25

## 2019-12-16 NOTE — PROGRESS NOTES
SUBJECTIVE:   CC: Tawana Watson is an 59 year old woman who presents for preventive health visit.     Healthy Habits:     Getting at least 3 servings of Calcium per day:  Yes    Bi-annual eye exam:  Yes    Dental care twice a year:  Yes    Sleep apnea or symptoms of sleep apnea:  None    Diet:  Regular (no restrictions)    Frequency of exercise:  2-3 days/week    Duration of exercise:  45-60 minutes    Taking medications regularly:  Not Applicable    Medication side effects:  Not applicable    PHQ-2 Total Score: 0    Additional concerns today:  No      Family History of Pancreatic Cancer - The patient has a family history of pancreatic cancer in her mother that started at age 67. She had a cancer antigen 19-9 completed on 12/8/2016 which was normal. Ultrasound was ordered for screening 4/2018 completed at [facility] in [place] and was normal.    Colon Cancer Screening - The patient's last colonoscopy was on 9/24/2010 which was normal. She is due for a repeat colonoscopy in 2020.    Carpal Tunnel syndrome of right wrist:  Patient was seen on 11/30/19 with Dr. Guadarrama at the [facility] for her right hand/wrist numbness. She was not able to wear her ring and believed it to be her weight gain.The EMG showed carpal tunnel syndrome.  Is having surgery Monday 12/23 for a carpal tunnel release of her right wrist.  .    Family history of Thyroid disorder/ Goiter  Thyroid diffusely enlarged at last physical last year.  TSH normal. Sister recently was found to have thyroid cancer at age 63.      Today's PHQ-2 Score:   PHQ-2 ( 1999 Pfizer) 12/15/2019   Q1: Little interest or pleasure in doing things 0   Q2: Feeling down, depressed or hopeless 0   PHQ-2 Score 0   Q1: Little interest or pleasure in doing things Not at all   Q2: Feeling down, depressed or hopeless Not at all   PHQ-2 Score 0       Abuse: Current or Past(Physical, Sexual or Emotional)- No  Do you feel safe in your environment? Yes        Social History      Tobacco Use     Smoking status: Former Smoker     Packs/day: 1.50     Years: 10.00     Pack years: 15.00     Types: Cigarettes     Last attempt to quit: 1/15/1986     Years since quittin.9     Smokeless tobacco: Never Used   Substance Use Topics     Alcohol use: Yes     Comment: 1-2 beers a week     If you drink alcohol do you typically have >3 drinks per day or >7 drinks per week? No      Reviewed orders with patient.  Reviewed health maintenance and updated orders accordingly - Yes  Lab work is in process    Mammogram Screening: Patient over age 50, mutual decision to screen reflected in health maintenance.    Pertinent mammograms are reviewed under the imaging tab.  History of abnormal Pap smear: NO - age 30- 65 PAP every 3 years recommended  PAP / HPV Latest Ref Rng & Units 12/3/2015 2011 2010   PAP - NIL NIL NIL   HPV 16 DNA NEG Negative - -   HPV 18 DNA NEG Negative - -   OTHER HR HPV NEG Negative - -     Reviewed and updated as needed this visit by clinical staff  Tobacco  Allergies  Meds  Problems  Med Hx  Surg Hx  Fam Hx  Soc Hx            Review of Systems  CONSTITUTIONAL: NEGATIVE for fever, chills, change in weight  INTEGUMENTARY/SKIN: NEGATIVE for worrisome rashes, moles or lesions  EYES: NEGATIVE for vision changes or irritation  ENT: NEGATIVE for ear, mouth and throat problems  RESP: NEGATIVE for significant cough or SOB  BREAST: NEGATIVE for masses, tenderness or discharge  CV: NEGATIVE for chest pain, palpitations or peripheral edema  GI: NEGATIVE for nausea, abdominal pain, heartburn, or change in bowel habits  : NEGATIVE for unusual urinary or vaginal symptoms. No vaginal bleeding.  MUSCULOSKELETAL: NEGATIVE for significant arthralgias or myalgia  NEURO: NEGATIVE for weakness, dizziness or paresthesias  PSYCHIATRIC: NEGATIVE for changes in mood or affect      OBJECTIVE:   /74 (BP Location: Right arm, Cuff Size: Adult Regular)   Pulse 85   Temp 97.4  F (36.3  C)  "(Oral)   Ht 1.549 m (5' 1\")   Wt 64.9 kg (143 lb)   LMP 11/01/2008   SpO2 98%   BMI 27.02 kg/m    Physical Exam  GENERAL: healthy, alert and no distress  EYES: Eyes grossly normal to inspection, PERRL and conjunctivae and sclerae normal  HENT: ear canals and TM's normal, nose and mouth without ulcers or lesions  NECK: no adenopathy, no asymmetry, masses, or scars and thyroid normal to palpation  RESP: lungs clear to auscultation - no rales, rhonchi or wheezes  BREAST: normal without masses, tenderness or nipple discharge and no palpable axillary masses or adenopathy  CV: regular rate and rhythm, normal S1 S2, no S3 or S4, no murmur, click or rub, no peripheral edema and peripheral pulses strong  ABDOMEN: soft, nontender, no hepatosplenomegaly, no masses and bowel sounds normal  MS: no gross musculoskeletal defects noted, no edema  SKIN: no suspicious lesions or rashes  NEURO: Normal strength and tone, mentation intact and speech normal  PSYCH: mentation appears normal, affect normal/bright    Diagnostic Test Results:  Labs reviewed in Epic  Results for orders placed or performed in visit on 12/18/19 (from the past 24 hour(s))   CBC with platelets   Result Value Ref Range    WBC 6.8 4.0 - 11.0 10e9/L    RBC Count 4.33 3.8 - 5.2 10e12/L    Hemoglobin 13.2 11.7 - 15.7 g/dL    Hematocrit 38.1 35.0 - 47.0 %    MCV 88 78 - 100 fl    MCH 30.5 26.5 - 33.0 pg    MCHC 34.6 31.5 - 36.5 g/dL    RDW 12.8 10.0 - 15.0 %    Platelet Count 238 150 - 450 10e9/L       ASSESSMENT/PLAN:   Tawana was seen today for physical.    Diagnoses and all orders for this visit:    Routine general medical examination at a health care facility   59 year old female. Discussed a well-balanced diet and regular exercise.     Family history of pancreatic cancer - mother age 67  Cancer antigen 19-9 completed on 12/8/2016 was normal. Completed US on 4/2018 at the [facility] in [place].      Goiter  Family history of thyroid cancer  Historical. Thyroid US " "ordered for screening. Will recheck thyroid levels today.  -     TSH with free T4 reflex  -     US Thyroid; Future    Carpal tunnel syndrome of right wrist  Seen on 11/30/19 with Dr. Guadarrama at the [facility] and EMG demonstrated Carpal Tunnel syndrome.  Having surgery 12/23.    Screening for deficiency anemia  Routine screening.   -     CBC with platelets    Screening for diabetes mellitus  Routine screening.   -     Comprehensive metabolic panel    Lipid screening  Routine screening.   -     Lipid panel reflex to direct LDL Fasting    Screen for colon cancer  Due for Colonoscopy in 2020.  -     GASTROENTEROLOGY ADULT REF PROCEDURE ONLY [facility] [phone]; No Provider Preference    Visit for screening mammogram  Patient will schedule.  -     *MA Screening Digital Bilateral; Future        COUNSELING:  Reviewed preventive health counseling, as reflected in patient instructions       Regular exercise       Healthy diet/nutrition       Vision screening       Hearing screening       Immunizations        Estimated body mass index is 27.02 kg/m  as calculated from the following:    Height as of this encounter: 1.549 m (5' 1\").    Weight as of this encounter: 64.9 kg (143 lb).         reports that she quit smoking about 33 years ago. Her smoking use included cigarettes. She has a 15.00 pack-year smoking history. She has never used smokeless tobacco.      Counseling Resources:  ATP IV Guidelines  Pooled Cohorts Equation Calculator  Breast Cancer Risk Calculator  FRAX Risk Assessment  ICSI Preventive Guidelines  Dietary Guidelines for Americans, 2010  USDA's MyPlate  ASA Prophylaxis  Lung CA Screening   [address] [address]  [email or web address]   Office: [phone]           Oksana Rubalcava MS, PA-C    MARYA GilbertC  [facility]  "

## 2019-12-18 ENCOUNTER — OFFICE VISIT (OUTPATIENT)
Dept: FAMILY MEDICINE | Facility: CLINIC | Age: 59
End: 2019-12-18
Payer: COMMERCIAL

## 2019-12-18 ENCOUNTER — TRANSFERRED RECORDS (OUTPATIENT)
Dept: HEALTH INFORMATION MANAGEMENT | Facility: CLINIC | Age: 59
End: 2019-12-18

## 2019-12-18 VITALS
BODY MASS INDEX: 27 KG/M2 | HEART RATE: 85 BPM | TEMPERATURE: 97.4 F | OXYGEN SATURATION: 98 % | DIASTOLIC BLOOD PRESSURE: 74 MMHG | WEIGHT: 143 LBS | SYSTOLIC BLOOD PRESSURE: 110 MMHG | HEIGHT: 61 IN

## 2019-12-18 DIAGNOSIS — G56.01 CARPAL TUNNEL SYNDROME OF RIGHT WRIST: ICD-10-CM

## 2019-12-18 DIAGNOSIS — Z12.31 VISIT FOR SCREENING MAMMOGRAM: ICD-10-CM

## 2019-12-18 DIAGNOSIS — E04.9 GOITER: ICD-10-CM

## 2019-12-18 DIAGNOSIS — Z00.00 ROUTINE GENERAL MEDICAL EXAMINATION AT A HEALTH CARE FACILITY: Primary | ICD-10-CM

## 2019-12-18 DIAGNOSIS — Z80.0 FAMILY HISTORY OF PANCREATIC CANCER: ICD-10-CM

## 2019-12-18 DIAGNOSIS — Z12.11 SCREEN FOR COLON CANCER: ICD-10-CM

## 2019-12-18 DIAGNOSIS — Z13.1 SCREENING FOR DIABETES MELLITUS: ICD-10-CM

## 2019-12-18 DIAGNOSIS — Z80.8 FAMILY HISTORY OF THYROID CANCER: ICD-10-CM

## 2019-12-18 DIAGNOSIS — Z13.0 SCREENING FOR DEFICIENCY ANEMIA: ICD-10-CM

## 2019-12-18 DIAGNOSIS — Z13.220 LIPID SCREENING: ICD-10-CM

## 2019-12-18 PROBLEM — M75.02 ADHESIVE CAPSULITIS OF LEFT SHOULDER: Status: RESOLVED | Noted: 2018-04-19 | Resolved: 2019-12-18

## 2019-12-18 LAB
ERYTHROCYTE [DISTWIDTH] IN BLOOD BY AUTOMATED COUNT: 12.8 % (ref 10–15)
HCT VFR BLD AUTO: 38.1 % (ref 35–47)
HGB BLD-MCNC: 13.2 G/DL (ref 11.7–15.7)
MCH RBC QN AUTO: 30.5 PG (ref 26.5–33)
MCHC RBC AUTO-ENTMCNC: 34.6 G/DL (ref 31.5–36.5)
MCV RBC AUTO: 88 FL (ref 78–100)
PLATELET # BLD AUTO: 238 10E9/L (ref 150–450)
RBC # BLD AUTO: 4.33 10E12/L (ref 3.8–5.2)
WBC # BLD AUTO: 6.8 10E9/L (ref 4–11)

## 2019-12-18 PROCEDURE — 80053 COMPREHEN METABOLIC PANEL: CPT | Performed by: PHYSICIAN ASSISTANT

## 2019-12-18 PROCEDURE — 80061 LIPID PANEL: CPT | Performed by: PHYSICIAN ASSISTANT

## 2019-12-18 PROCEDURE — 84443 ASSAY THYROID STIM HORMONE: CPT | Performed by: PHYSICIAN ASSISTANT

## 2019-12-18 PROCEDURE — 36415 COLL VENOUS BLD VENIPUNCTURE: CPT | Performed by: PHYSICIAN ASSISTANT

## 2019-12-18 PROCEDURE — 99213 OFFICE O/P EST LOW 20 MIN: CPT | Mod: 25 | Performed by: PHYSICIAN ASSISTANT

## 2019-12-18 PROCEDURE — 99396 PREV VISIT EST AGE 40-64: CPT | Performed by: PHYSICIAN ASSISTANT

## 2019-12-18 PROCEDURE — 85027 COMPLETE CBC AUTOMATED: CPT | Performed by: PHYSICIAN ASSISTANT

## 2019-12-18 ASSESSMENT — MIFFLIN-ST. JEOR: SCORE: 1161.02

## 2019-12-19 LAB
ALBUMIN SERPL-MCNC: 4.1 G/DL (ref 3.4–5)
ALP SERPL-CCNC: 78 U/L (ref 40–150)
ALT SERPL W P-5'-P-CCNC: 22 U/L (ref 0–50)
ANION GAP SERPL CALCULATED.3IONS-SCNC: 4 MMOL/L (ref 3–14)
AST SERPL W P-5'-P-CCNC: 17 U/L (ref 0–45)
BILIRUB SERPL-MCNC: 1 MG/DL (ref 0.2–1.3)
BUN SERPL-MCNC: 15 MG/DL (ref 7–30)
CALCIUM SERPL-MCNC: 9.1 MG/DL (ref 8.5–10.1)
CHLORIDE SERPL-SCNC: 106 MMOL/L (ref 94–109)
CHOLEST SERPL-MCNC: 198 MG/DL
CO2 SERPL-SCNC: 28 MMOL/L (ref 20–32)
CREAT SERPL-MCNC: 0.74 MG/DL (ref 0.52–1.04)
GFR SERPL CREATININE-BSD FRML MDRD: 88 ML/MIN/{1.73_M2}
GLUCOSE SERPL-MCNC: 95 MG/DL (ref 70–99)
HDLC SERPL-MCNC: 60 MG/DL
LDLC SERPL CALC-MCNC: 113 MG/DL
NONHDLC SERPL-MCNC: 138 MG/DL
POTASSIUM SERPL-SCNC: 4.1 MMOL/L (ref 3.4–5.3)
PROT SERPL-MCNC: 7.3 G/DL (ref 6.8–8.8)
SODIUM SERPL-SCNC: 138 MMOL/L (ref 133–144)
TRIGL SERPL-MCNC: 125 MG/DL
TSH SERPL DL<=0.005 MIU/L-ACNC: 1.85 MU/L (ref 0.4–4)

## 2019-12-19 NOTE — RESULT ENCOUNTER NOTE
Tawana  I have reviewed your recent labs. Here are the results:    -Normal red blood cell (hgb) levels, normal white blood cell count and normal platelet levels.  -LDL(bad) cholesterol level is elevated which can increase your heart disease risk.  A diet high in fat and simple carbohydrates, genetics and being overweight can contribute to this. ADVISE: exercising 150 minutes of aerobic exercise per week (30 minutes for 5 days per week or 50 minutes for 3 days per week are options) and eating a low saturated fat/low carbohydrate diet are helpful to improve this. In 12 months, you should recheck your fasting cholesterol panel by scheduling a lab-only appointment.  -Liver and gallbladder tests are normal (ALT,AST, Alk phos, bilirubin), kidney function is normal (Cr, GFR), sodium is normal, potassium is normal, calcium is normal, glucose is normal.  -TSH (thyroid stimulating hormone) level is normal which indicates normal thyroid function.      If you have any questions please do not hesitate to contact our office via phone ([phone]) or MyChart.    Oksana Rubalcava, MS, PA-C  [facility] [place]

## 2019-12-20 ENCOUNTER — TELEPHONE (OUTPATIENT)
Dept: FAMILY MEDICINE | Facility: CLINIC | Age: 59
End: 2019-12-20

## 2019-12-20 DIAGNOSIS — Z80.8 FAMILY HISTORY OF THYROID CANCER: Primary | ICD-10-CM

## 2019-12-20 DIAGNOSIS — Z12.31 ENCOUNTER FOR SCREENING MAMMOGRAM FOR BREAST CANCER: ICD-10-CM

## 2019-12-20 NOTE — TELEPHONE ENCOUNTER
The fax for [facility] is  # [phone]  Please fax the order there for the [place].  Patient is going there Monday morning.  Sary Ramon, Clinic Receptionist      Any questions call [phone] cell   or  , [place] # [phone]

## 2019-12-20 NOTE — TELEPHONE ENCOUNTER
Health Maintenance Due   Topic Date Due     ZOSTER IMMUNIZATION (1 of 2) 08/24/2010     MAMMO SCREENING  12/07/2019 12/18/2019 OV Notes:   Family history of thyroid cancer  Historical. Thyroid US ordered for screening. Will recheck thyroid levels today.  -     TSH with free T4 reflex  -     US Thyroid; Future    Visit for screening mammogram  Patient will schedule.  -     *MA Screening Digital Bilateral; Future    Routing to PCP for further review/recommendations/orders.  Orders pended    Lexi Ramon RN  [facility]

## 2019-12-20 NOTE — TELEPHONE ENCOUNTER
Oksana patient is calling to get an order for a screening mammogram and also a order for a U/S of her thyroid at [facility] in [place]. With her insurance the coverage is better if she goes to this location. Please advise. Thanks    Alma Rosa Mauricio  Referral Coordinator

## 2019-12-23 ENCOUNTER — NURSE TRIAGE (OUTPATIENT)
Dept: NURSING | Facility: CLINIC | Age: 59
End: 2019-12-23

## 2019-12-23 NOTE — TELEPHONE ENCOUNTER
Requesting thyroid ultrasound order be faxed to [facility].  Able to get in at said facility at 08:30 today.  Did fax to [phone] at 7:25 am.      Reason for Disposition    [1] Follow-up call to recent contact AND [2] information only call, no triage required    Protocols used: INFORMATION ONLY CALL-A-

## 2020-01-03 ENCOUNTER — TRANSFERRED RECORDS (OUTPATIENT)
Dept: HEALTH INFORMATION MANAGEMENT | Facility: CLINIC | Age: 60
End: 2020-01-03

## 2020-01-18 ENCOUNTER — MYC MEDICAL ADVICE (OUTPATIENT)
Dept: FAMILY MEDICINE | Facility: CLINIC | Age: 60
End: 2020-01-18

## 2020-01-18 DIAGNOSIS — Z80.8 FAMILY HISTORY OF THYROID CANCER: ICD-10-CM

## 2020-01-18 DIAGNOSIS — E04.1 THYROID NODULE: Primary | ICD-10-CM

## 2020-01-20 NOTE — TELEPHONE ENCOUNTER
Please see my chart message below     Please review and advise     Thank you     Tarsha Salguero RN, BSN  [facility]

## 2020-01-24 ENCOUNTER — TRANSFERRED RECORDS (OUTPATIENT)
Dept: HEALTH INFORMATION MANAGEMENT | Facility: CLINIC | Age: 60
End: 2020-01-24

## 2020-02-06 ENCOUNTER — TELEPHONE (OUTPATIENT)
Dept: FAMILY MEDICINE | Facility: CLINIC | Age: 60
End: 2020-02-06

## 2020-02-06 DIAGNOSIS — E04.1 THYROID NODULE: ICD-10-CM

## 2020-02-06 NOTE — TELEPHONE ENCOUNTER
Please call pt.      Great news!  I received the pathology report from [facility] for her thyroid biopsy that was done @ [place] on 1/24/2020 and the pathology favors a benign colloid nodule.      Due to her family history of thyroid cancer, I recommend repeating her ultrasound next year for surveillance.      Oksana Rubalcava MBA, MS, PA-C

## 2020-02-07 NOTE — TELEPHONE ENCOUNTER
Attempt #2  Called patient @ # below - Left a non-detailed message to call back and speak with any triage nurse.    Lexi Ramon RN  [facility]

## 2020-02-07 NOTE — TELEPHONE ENCOUNTER
Pt called back and advised of note below.    Dez Kilgore RN   [facility]

## 2020-10-12 ENCOUNTER — TELEPHONE (OUTPATIENT)
Dept: FAMILY MEDICINE | Facility: CLINIC | Age: 60
End: 2020-10-12

## 2020-10-12 NOTE — TELEPHONE ENCOUNTER
Patient called requesting a hard copy of her referral for a colonoscopy to be faxed to [facility].  She works there and would like to have her procedure done there as well.  The fax number is [phone].

## 2020-10-16 ENCOUNTER — MYC MEDICAL ADVICE (OUTPATIENT)
Dept: FAMILY MEDICINE | Facility: CLINIC | Age: 60
End: 2020-10-16

## 2020-10-16 DIAGNOSIS — Z12.11 SCREEN FOR COLON CANCER: Primary | ICD-10-CM

## 2020-12-17 ENCOUNTER — OFFICE VISIT (OUTPATIENT)
Dept: FAMILY MEDICINE | Facility: CLINIC | Age: 60
End: 2020-12-17
Payer: COMMERCIAL

## 2020-12-17 VITALS
SYSTOLIC BLOOD PRESSURE: 118 MMHG | TEMPERATURE: 97.9 F | HEART RATE: 66 BPM | WEIGHT: 138 LBS | HEIGHT: 61 IN | BODY MASS INDEX: 26.06 KG/M2 | OXYGEN SATURATION: 98 % | DIASTOLIC BLOOD PRESSURE: 64 MMHG

## 2020-12-17 DIAGNOSIS — Z13.0 SCREENING FOR DEFICIENCY ANEMIA: ICD-10-CM

## 2020-12-17 DIAGNOSIS — Z00.00 ROUTINE GENERAL MEDICAL EXAMINATION AT A HEALTH CARE FACILITY: Primary | ICD-10-CM

## 2020-12-17 DIAGNOSIS — K63.5 SESSILE COLONIC POLYP: ICD-10-CM

## 2020-12-17 DIAGNOSIS — Z13.220 LIPID SCREENING: ICD-10-CM

## 2020-12-17 DIAGNOSIS — E04.1 THYROID NODULE: ICD-10-CM

## 2020-12-17 DIAGNOSIS — Z13.1 SCREENING FOR DIABETES MELLITUS: ICD-10-CM

## 2020-12-17 DIAGNOSIS — Z12.11 SCREEN FOR COLON CANCER: ICD-10-CM

## 2020-12-17 DIAGNOSIS — Z12.31 VISIT FOR SCREENING MAMMOGRAM: ICD-10-CM

## 2020-12-17 DIAGNOSIS — N95.2 VAGINAL ATROPHY: ICD-10-CM

## 2020-12-17 LAB
ERYTHROCYTE [DISTWIDTH] IN BLOOD BY AUTOMATED COUNT: 12.9 % (ref 10–15)
HCT VFR BLD AUTO: 40.6 % (ref 35–47)
HGB BLD-MCNC: 13.8 G/DL (ref 11.7–15.7)
MCH RBC QN AUTO: 30.1 PG (ref 26.5–33)
MCHC RBC AUTO-ENTMCNC: 34 G/DL (ref 31.5–36.5)
MCV RBC AUTO: 89 FL (ref 78–100)
PLATELET # BLD AUTO: 224 10E9/L (ref 150–450)
RBC # BLD AUTO: 4.58 10E12/L (ref 3.8–5.2)
WBC # BLD AUTO: 6.7 10E9/L (ref 4–11)

## 2020-12-17 PROCEDURE — 80053 COMPREHEN METABOLIC PANEL: CPT | Performed by: PHYSICIAN ASSISTANT

## 2020-12-17 PROCEDURE — 36415 COLL VENOUS BLD VENIPUNCTURE: CPT | Performed by: PHYSICIAN ASSISTANT

## 2020-12-17 PROCEDURE — 84443 ASSAY THYROID STIM HORMONE: CPT | Performed by: PHYSICIAN ASSISTANT

## 2020-12-17 PROCEDURE — 99396 PREV VISIT EST AGE 40-64: CPT | Performed by: PHYSICIAN ASSISTANT

## 2020-12-17 PROCEDURE — 87624 HPV HI-RISK TYP POOLED RSLT: CPT | Performed by: PHYSICIAN ASSISTANT

## 2020-12-17 PROCEDURE — G0145 SCR C/V CYTO,THINLAYER,RESCR: HCPCS | Performed by: PHYSICIAN ASSISTANT

## 2020-12-17 PROCEDURE — 99213 OFFICE O/P EST LOW 20 MIN: CPT | Mod: 25 | Performed by: PHYSICIAN ASSISTANT

## 2020-12-17 PROCEDURE — 85027 COMPLETE CBC AUTOMATED: CPT | Performed by: PHYSICIAN ASSISTANT

## 2020-12-17 PROCEDURE — 80061 LIPID PANEL: CPT | Performed by: PHYSICIAN ASSISTANT

## 2020-12-17 RX ORDER — ESTRADIOL 0.1 MG/G
2 CREAM VAGINAL
Qty: 42.5 G | Refills: 0 | Status: SHIPPED | OUTPATIENT
Start: 2020-12-18 | End: 2023-01-19

## 2020-12-17 RX ORDER — ZOSTER VACCINE RECOMBINANT, ADJUVANTED 50 MCG/0.5
KIT INTRAMUSCULAR
COMMUNITY
Start: 2020-07-23 | End: 2023-01-19

## 2020-12-17 SDOH — HEALTH STABILITY: MENTAL HEALTH: HOW OFTEN DO YOU HAVE 6 OR MORE DRINKS ON ONE OCCASION?: NOT ASKED

## 2020-12-17 SDOH — HEALTH STABILITY: MENTAL HEALTH: HOW MANY STANDARD DRINKS CONTAINING ALCOHOL DO YOU HAVE ON A TYPICAL DAY?: NOT ASKED

## 2020-12-17 SDOH — HEALTH STABILITY: MENTAL HEALTH: HOW OFTEN DO YOU HAVE A DRINK CONTAINING ALCOHOL?: NOT ASKED

## 2020-12-17 ASSESSMENT — MIFFLIN-ST. JEOR: SCORE: 1133.34

## 2020-12-17 NOTE — PROGRESS NOTES
SUBJECTIVE:   CC: Tawana Watson is an 60 year old woman who presents for preventive health visit.       Patient has been advised of split billing requirements and indicates understanding: Yes  Healthy Habits:     Getting at least 3 servings of Calcium per day:  NO    Bi-annual eye exam:  NO    Dental care twice a year:  Yes    Sleep apnea or symptoms of sleep apnea:  None    Diet:  Regular (no restrictions)    Frequency of exercise:  4-5 days/week    Duration of exercise:  45-60 minutes    Taking medications regularly:  Not Applicable    Medication side effects:  Not applicable    PHQ-2 Total Score: 0    Additional concerns today:  No    Sessile colon polyp  Patient had a colonoscopy in 12/4/2020 at [place].  She did have one sessile colon polyp that was 4 mm as well as a tubular adenoma.  Recommend 5-year follow-up    Painful intercourse  Patient notes that over the course of the summer in the last year that intercourse has been painful despite lubrication.  She is status post a supracervical hysterectomy but her ovaries remain.  She is not experiencing any other symptoms of menopause that she is aware of.    Family History of Pancreatic Cancer - The patient has a family history of pancreatic cancer in her mother that started at age 67. She had a cancer antigen 19-9 completed on 12/8/2016 which was normal. Ultrasound was ordered for screening 4/2018 completed at [facility] in [place] and was normal.    Family history of Thyroid disorder/ Goiter  Thyroid diffusely enlarged at last physical last year.  TSH normal. Sister recently was found to have thyroid cancer at age 63.  She had a thyroid ultrasound in January 2020 that showed a nodule that was subsequently biopsied and consistent with a colloid benign nodule.  Recommend repeat ultrasound this year.      Today's PHQ-2 Score:   PHQ-2 ( 1999 [employer]) 12/14/2020   Q1: Little interest or pleasure in doing things 0   Q2: Feeling down, depressed or hopeless 0   PHQ-2  Score 0   Q1: Little interest or pleasure in doing things Not at all   Q2: Feeling down, depressed or hopeless Not at all   PHQ-2 Score 0       Abuse: Current or Past (Physical, Sexual or Emotional) - No  Do you feel safe in your environment? Yes    Have you ever done Advance Care Planning? (For example, a Health Directive, POLST, or a discussion with a medical provider or your loved ones about your wishes): No, advance care planning information given to patient to review.  Patient plans to discuss their wishes with loved ones or provider.      Social History     Tobacco Use     Smoking status: Former Smoker     Packs/day: 1.50     Years: 10.00     Pack years: 15.00     Types: Cigarettes     Quit date: 1/15/1986     Years since quittin.9     Smokeless tobacco: Never Used   Substance Use Topics     Alcohol use: Yes     Comment: 1-2 beers a week     If you drink alcohol do you typically have >3 drinks per day or >7 drinks per week? No    Alcohol Use 2020   Prescreen: >3 drinks/day or >7 drinks/week? No   Prescreen: >3 drinks/day or >7 drinks/week? -       Reviewed orders with patient.  Reviewed health maintenance and updated orders accordingly - Yes  BP Readings from Last 3 Encounters:   20 118/64   19 110/74   18 110/70    Wt Readings from Last 3 Encounters:   20 62.6 kg (138 lb)   19 64.9 kg (143 lb)   18 57.3 kg (126 lb 4 oz)                  Patient Active Problem List   Diagnosis     Diverticulosis of colon     Advanced directives, counseling/discussion     Goiter     Family history of pancreatic cancer - mother age 67     Thyroid nodule - biopsy 2020 showed colloid nodule - due to family history of thyroid cancer - plan for repeat thyroid ultrasound      Sessile colonic polyp - 2020 - repeat colonoscopy 2025     Past Surgical History:   Procedure Laterality Date      SECTION  87, 90     COLONOSCOPY  2010    no polyps repeat 10      COLONOSCOPY  2020    sessile serrated adenoma - repeat 5 years     HYSTERECTOMY, SUPRACERVICAL LAPAROSCOPIC  2008    fibroid - ovaries spared     MAMMOPLASTY REDUCTION  2006     PHLEBECTOMY MULTIPLE STAB Left 10/15/1996    varicosity - painful     SURGICAL HISTORY OF -   2000    Esophagram - difficulty swallowing     TUBAL LIGATION  1990    s/p TL       Social History     Tobacco Use     Smoking status: Former Smoker     Packs/day: 1.50     Years: 10.00     Pack years: 15.00     Types: Cigarettes     Quit date: 1/15/1986     Years since quittin.9     Smokeless tobacco: Never Used   Substance Use Topics     Alcohol use: Yes     Comment: 1-2 beers a week     Family History   Problem Relation Age of Onset     Pancreatic Cancer Mother         pancreatic, +67     Hypertension Mother      Diabetes Father          at 40     Thyroid Cancer Sister 63     Breast Cancer No family hx of      Cancer - colorectal No family hx of      C.A.D. No family hx of          Current Outpatient Medications   Medication Sig Dispense Refill     IBUPROFEN PO Take 200 mg by mouth 4 times daily       SHINGRIX injection          Mammogram Screening: Patient over age 50, mutual decision to screen reflected in health maintenance.    Pertinent mammograms are reviewed under the imaging tab.  History of abnormal Pap smear: NO - age 30- 65 PAP every 3 years recommended  PAP / HPV Latest Ref Rng & Units 12/3/2015 2011 2010   PAP - NIL NIL NIL   HPV 16 DNA NEG Negative - -   HPV 18 DNA NEG Negative - -   OTHER HR HPV NEG Negative - -     Reviewed and updated as needed this visit by clinical staff   Allergies  Meds              Reviewed and updated as needed this visit by Provider                Past Medical History:   Diagnosis Date     Adhesive capsulitis of left shoulder 2018     Diverticulosis of colon     noted on colonoscopy     Family history of pancreatic cancer 2016     Frozen  shoulder     x 2 - both responded to cortisone + PT     Goiter, unspecified 00     Leiomyoma of uterus, unspecified 4-     Other and unspecified ovarian cyst 4-      Past Surgical History:   Procedure Laterality Date      SECTION  87, 90     COLONOSCOPY  2010    no polyps repeat 10     COLONOSCOPY  2020    sessile serrated adenoma - repeat 5 years     HYSTERECTOMY, SUPRACERVICAL LAPAROSCOPIC  2008    fibroid - ovaries spared     MAMMOPLASTY REDUCTION  2006     PHLEBECTOMY MULTIPLE STAB Left 10/15/1996    varicosity - painful     SURGICAL HISTORY OF -   2000    Esophagram - difficulty swallowing     TUBAL LIGATION  1990    s/p TL       Review of Systems  CONSTITUTIONAL: NEGATIVE for fever, chills, change in weight  INTEGUMENTARY/SKIN: NEGATIVE for worrisome rashes, moles or lesions  EYES: NEGATIVE for vision changes or irritation  ENT: NEGATIVE for ear, mouth and throat problems  RESP: NEGATIVE for significant cough or SOB  BREAST: NEGATIVE for masses, tenderness or discharge  CV: NEGATIVE for chest pain, palpitations or peripheral edema  GI: NEGATIVE for nausea, abdominal pain, heartburn, or change in bowel habits  : NEGATIVE for unusual urinary or vaginal symptoms. No vaginal bleeding.  MUSCULOSKELETAL: NEGATIVE for significant arthralgias or myalgia  NEURO: NEGATIVE for weakness, dizziness or paresthesias  ENDOCRINE: NEGATIVE for temperature intolerance, skin/hair changes  PSYCHIATRIC: NEGATIVE for changes in mood or affect      OBJECTIVE:   LMP 2008   Physical Exam  GENERAL APPEARANCE: healthy, alert and no distress  EYES: Eyes grossly normal to inspection, PERRL and conjunctivae and sclerae normal  HENT: ear canals and TM's normal, nose and mouth without ulcers or lesions, oropharynx clear and oral mucous membranes moist  NECK: no adenopathy, no asymmetry, masses, or scars and thyroid normal to palpation  RESP: lungs clear to auscultation - no rales,  rhonchi or wheezes  BREAST: normal without masses, tenderness or nipple discharge and no palpable axillary masses or adenopathy  CV: regular rate and rhythm, normal S1 S2, no S3 or S4, no murmur, click or rub, no peripheral edema and peripheral pulses strong  ABDOMEN: soft, nontender, no hepatosplenomegaly, no masses and bowel sounds normal   (female): normal female external genitalia, normal urethral meatus, vaginal mucosal atrophy noted, normal cervix, adnexae, and uterus without masses or abnormal discharge  MS: no musculoskeletal defects are noted and gait is age appropriate without ataxia  SKIN: no suspicious lesions or rashes  NEURO: Normal strength and tone, sensory exam grossly normal, mentation intact and speech normal  PSYCH: mentation appears normal and affect normal/bright    Diagnostic Test Results:  Results for orders placed or performed in visit on 12/17/20 (from the past 24 hour(s))   CBC with platelets   Result Value Ref Range    WBC 6.7 4.0 - 11.0 10e9/L    RBC Count 4.58 3.8 - 5.2 10e12/L    Hemoglobin 13.8 11.7 - 15.7 g/dL    Hematocrit 40.6 35.0 - 47.0 %    MCV 89 78 - 100 fl    MCH 30.1 26.5 - 33.0 pg    MCHC 34.0 31.5 - 36.5 g/dL    RDW 12.9 10.0 - 15.0 %    Platelet Count 224 150 - 450 10e9/L       ASSESSMENT/PLAN:   Tawana was seen today for physical.    Diagnoses and all orders for this visit:    Routine general medical examination at a health care facility    Sessile colonic polyp - 12/4/2020 - repeat colonoscopy 12/2025    Thyroid nodule - biopsy 1/24/2020 showed colloid nodule - due to family history of thyroid cancer - plan for repeat thyroid ultrasound 2021  Repeat ultrasound ordered .  Labs for surveillance  -     US Thyroid; Future  -     TSH with free T4 reflex    Visit for screening mammogram  Routine screening  -     *MA Screening Digital Bilateral; Future    Screening for deficiency anemia  Routine screening  -     CBC with platelets    Screening for diabetes mellitus  Routine  "screening  -     Comprehensive metabolic panel (BMP + Alb, Alk Phos, ALT, AST, Total. Bili, TP)    Lipid screening  Routine screening  -     Lipid panel reflex to direct LDL Fasting    Screen for colon cancer  Routine screening  -     Pap imaged thin layer screen with HPV - recommended age 30 - 65 years (select HPV order below)  -     HPV High Risk Types DNA Cervical    Vaginal atrophy  Significant vaginal atrophy on exam.  Due to dyspareunia as well as this atrophy will start with a topical estradiol 3 times weekly.  Patient will keep me apprised if symptoms are not improving.  -     estradiol (ESTRACE) 0.1 MG/GM vaginal cream; Place 2 g vaginally three times a week        Patient has been advised of split billing requirements and indicates understanding: Yes  COUNSELING:  Reviewed preventive health counseling, as reflected in patient instructions       Regular exercise       Healthy diet/nutrition       Osteoporosis prevention/bone health       (Claudine)menopause management    Estimated body mass index is 27.02 kg/m  as calculated from the following:    Height as of 12/18/19: 1.549 m (5' 1\").    Weight as of 12/18/19: 64.9 kg (143 lb).    Weight management plan: Discussed healthy diet and exercise guidelines    She reports that she quit smoking about 34 years ago. Her smoking use included cigarettes. She has a 15.00 pack-year smoking history. She has never used smokeless tobacco.      Counseling Resources:  ATP IV Guidelines  Pooled Cohorts Equation Calculator  Breast Cancer Risk Calculator  BRCA-Related Cancer Risk Assessment: FHS-7 Tool  FRAX Risk Assessment  ICSI Preventive Guidelines  Dietary Guidelines for Americans, 2010  USDA's MyPlate  ASA Prophylaxis  Lung CA Screening    JOHNATHON Gilbert [facility] [place]  "

## 2020-12-17 NOTE — PROGRESS NOTES
"   SUBJECTIVE:   CC: Tawana Watson is an 60 year old woman who presents for preventive health visit.     {Split Bill scripting  The purpose of this visit is to discuss your medical history and prevent health problems before you are sick. You may be responsible for a co-pay, coinsurance, or deductible if your visit today includes services such as checking on a sore throat, having an x-ray or lab test, or treating and evaluating a new or existing condition :381049}  Patient has been advised of split billing requirements and indicates understanding: {Yes and No:302856}  Healthy Habits:    Do you get at least three servings of calcium containing foods daily (dairy, green leafy vegetables, etc.)? { :414194::\"yes\"}    Amount of exercise or daily activities, outside of work: { :907885}    Problems taking medications regularly { :464830::\"No\"}    Medication side effects: { :849354::\"No\"}    Have you had an eye exam in the past two years? { :279866}    Do you see a dentist twice per year? { :596926}    Do you have sleep apnea, excessive snoring or daytime drowsiness?{ :053399}  {Outside tests to abstract? :960531}    {additional problems to add (Optional):829427}    Today's PHQ-2 Score:   PHQ-2 (  [employer]) 2020 12/15/2019   Q1: Little interest or pleasure in doing things 0 0   Q2: Feeling down, depressed or hopeless 0 0   PHQ-2 Score 0 0   Q1: Little interest or pleasure in doing things Not at all Not at all   Q2: Feeling down, depressed or hopeless Not at all Not at all   PHQ-2 Score 0 0     {PHQ-2 LOOK IN ASSESSMENTS (Optional) :854115}  Abuse: Current or Past(Physical, Sexual or Emotional)- {YES/NO/NA:092453}  Do you feel safe in your environment? {YES/NO/NA:949277}        Social History     Tobacco Use     Smoking status: Former Smoker     Packs/day: 1.50     Years: 10.00     Pack years: 15.00     Types: Cigarettes     Quit date: 1/15/1986     Years since quittin.9     Smokeless tobacco: Never Used " "  Substance Use Topics     Alcohol use: Yes     Comment: 1-2 beers a week     If you drink alcohol do you typically have >3 drinks per day or >7 drinks per week? {ETOH :486480}                     Reviewed orders with patient.  Reviewed health maintenance and updated orders accordingly - {Yes/No:483242::\"Yes\"}  {Chronicprobdata (Optional):587468}    {Mammo Decision Support (Optional):721693}    Pertinent mammograms are reviewed under the imaging tab.  History of abnormal Pap smear: {PAP HX:803966}  PAP / HPV Latest Ref Rng & Units 12/3/2015 11/2/2011 9/28/2010   PAP - NIL NIL NIL   HPV 16 DNA NEG Negative - -   HPV 18 DNA NEG Negative - -   OTHER HR HPV NEG Negative - -     Reviewed and updated as needed this visit by clinical staff  Tobacco  Allergies  Meds  Problems  Med Hx  Surg Hx  Fam Hx          Reviewed and updated as needed this visit by Provider                {HISTORY OPTIONS (Optional):413216}    ROS:  { :303782}    OBJECTIVE:   /64   Pulse 66   Temp 97.9  F (36.6  C) (Tympanic)   Ht 1.549 m (5' 1\")   Wt 62.6 kg (138 lb)   LMP 11/01/2008   SpO2 98%   Breastfeeding No   BMI 26.07 kg/m    EXAM:  {Exam Choices:727129}    {Diagnostic Test Results (Optional):652856::\"Diagnostic Test Results:\",\"Labs reviewed in Epic\"}    ASSESSMENT/PLAN:   {Diag Picklist:592016}    Patient has been advised of split billing requirements and indicates understanding: {YES / NO:225593::\"Yes\"}  COUNSELING:   {FEMALE COUNSELING MESSAGES:886882::\"Reviewed preventive health counseling, as reflected in patient instructions\"}    Estimated body mass index is 26.07 kg/m  as calculated from the following:    Height as of this encounter: 1.549 m (5' 1\").    Weight as of this encounter: 62.6 kg (138 lb).    {Weight Management Plan (ACO) Complete if BMI is abnormal-  Ages 18-64  BMI >24.9.  Age 65+ with BMI <23 or >30 (Optional):355282}    She reports that she quit smoking about 34 years ago. Her smoking use included " cigarettes. She has a 15.00 pack-year smoking history. She has never used smokeless tobacco.      Counseling Resources:  ATP IV Guidelines  Pooled Cohorts Equation Calculator  Breast Cancer Risk Calculator  BRCA-Related Cancer Risk Assessment: FHS-7 Tool  FRAX Risk Assessment  ICSI Preventive Guidelines  Dietary Guidelines for Americans, 2010  USDA's MyPlate  ASA Prophylaxis  Lung CA Screening    JOHNATHON Gilbert [facility]

## 2020-12-18 LAB
ALBUMIN SERPL-MCNC: 4.2 G/DL (ref 3.4–5)
ALP SERPL-CCNC: 103 U/L (ref 40–150)
ALT SERPL W P-5'-P-CCNC: 41 U/L (ref 0–50)
ANION GAP SERPL CALCULATED.3IONS-SCNC: 5 MMOL/L (ref 3–14)
AST SERPL W P-5'-P-CCNC: 27 U/L (ref 0–45)
BILIRUB SERPL-MCNC: 1 MG/DL (ref 0.2–1.3)
BUN SERPL-MCNC: 13 MG/DL (ref 7–30)
CALCIUM SERPL-MCNC: 9.2 MG/DL (ref 8.5–10.1)
CHLORIDE SERPL-SCNC: 107 MMOL/L (ref 94–109)
CHOLEST SERPL-MCNC: 213 MG/DL
CO2 SERPL-SCNC: 28 MMOL/L (ref 20–32)
CREAT SERPL-MCNC: 0.72 MG/DL (ref 0.52–1.04)
GFR SERPL CREATININE-BSD FRML MDRD: >90 ML/MIN/{1.73_M2}
GLUCOSE SERPL-MCNC: 87 MG/DL (ref 70–99)
HDLC SERPL-MCNC: 69 MG/DL
LDLC SERPL CALC-MCNC: 124 MG/DL
NONHDLC SERPL-MCNC: 144 MG/DL
POTASSIUM SERPL-SCNC: 3.9 MMOL/L (ref 3.4–5.3)
PROT SERPL-MCNC: 7.6 G/DL (ref 6.8–8.8)
SODIUM SERPL-SCNC: 140 MMOL/L (ref 133–144)
TRIGL SERPL-MCNC: 99 MG/DL
TSH SERPL DL<=0.005 MIU/L-ACNC: 1.72 MU/L (ref 0.4–4)

## 2020-12-21 NOTE — RESULT ENCOUNTER NOTE
Tawana  I have reviewed your recent labs. Here are the results:    -Normal red blood cell (hgb) levels, normal white blood cell count and normal platelet levels.  -LDL(bad) cholesterol level is elevated which can increase your heart disease risk.  A diet high in fat and simple carbohydrates, genetics and being overweight can contribute to this. ADVISE: exercising 150 minutes of aerobic exercise per week (30 minutes for 5 days per week or 50 minutes for 3 days per week are options) and eating a low saturated fat/low carbohydrate diet are helpful to improve this. In 12 months, you should recheck your fasting cholesterol panel by scheduling a lab-only appointment.  -Liver and gallbladder tests are normal (ALT,AST, Alk phos, bilirubin), kidney function is normal (Cr, GFR), sodium is normal, potassium is normal, calcium is normal, glucose is normal.  -TSH (thyroid stimulating hormone) level is normal which indicates normal thyroid function.     If you have any questions please do not hesitate to contact our office via phone ([phone]) or MyChart.    Oksana Rubalcava MS, PA-C  [facility] [place]    The 10-year ASCVD risk score (Sadiaelmo LANGE Jr., et al., 2013) is: 2.5%    Values used to calculate the score:      Age: 60 years      Sex: Female      Is Non- : No      Diabetic: No      Tobacco smoker: No      Systolic Blood Pressure: 118 mmHg      Is BP treated: No      HDL Cholesterol: 69 mg/dL      Total Cholesterol: 213 mg/dL

## 2020-12-22 LAB
COPATH REPORT: NORMAL
PAP: NORMAL

## 2020-12-23 LAB
FINAL DIAGNOSIS: NORMAL
HPV HR 12 DNA CVX QL NAA+PROBE: NEGATIVE
HPV16 DNA SPEC QL NAA+PROBE: NEGATIVE
HPV18 DNA SPEC QL NAA+PROBE: NEGATIVE
SPECIMEN DESCRIPTION: NORMAL
SPECIMEN SOURCE CVX/VAG CYTO: NORMAL

## 2021-10-24 ENCOUNTER — HEALTH MAINTENANCE LETTER (OUTPATIENT)
Age: 61
End: 2021-10-24

## 2022-02-13 ENCOUNTER — HEALTH MAINTENANCE LETTER (OUTPATIENT)
Age: 62
End: 2022-02-13

## 2022-04-10 ENCOUNTER — HEALTH MAINTENANCE LETTER (OUTPATIENT)
Age: 62
End: 2022-04-10

## 2022-09-09 NOTE — PATIENT INSTRUCTIONS

## 2022-10-15 ENCOUNTER — HEALTH MAINTENANCE LETTER (OUTPATIENT)
Age: 62
End: 2022-10-15

## 2022-10-17 ENCOUNTER — MYC MEDICAL ADVICE (OUTPATIENT)
Dept: FAMILY MEDICINE | Facility: CLINIC | Age: 62
End: 2022-10-17

## 2023-01-16 ASSESSMENT — ENCOUNTER SYMPTOMS
MYALGIAS: 0
CONSTIPATION: 0
WEAKNESS: 0
NAUSEA: 0
SHORTNESS OF BREATH: 0
JOINT SWELLING: 0
BREAST MASS: 0
HEADACHES: 0
ABDOMINAL PAIN: 0
ARTHRALGIAS: 0
DIZZINESS: 0
PALPITATIONS: 0
HEMATOCHEZIA: 0
NERVOUS/ANXIOUS: 0
PARESTHESIAS: 0
COUGH: 0
CHILLS: 0
HEARTBURN: 0
DYSURIA: 0
HEMATURIA: 0
SORE THROAT: 0
DIARRHEA: 0
FREQUENCY: 0
FEVER: 0
EYE PAIN: 0

## 2023-01-19 ENCOUNTER — OFFICE VISIT (OUTPATIENT)
Dept: FAMILY MEDICINE | Facility: CLINIC | Age: 63
End: 2023-01-19
Payer: COMMERCIAL

## 2023-01-19 VITALS
OXYGEN SATURATION: 98 % | TEMPERATURE: 96.3 F | DIASTOLIC BLOOD PRESSURE: 66 MMHG | HEART RATE: 71 BPM | BODY MASS INDEX: 28.96 KG/M2 | RESPIRATION RATE: 18 BRPM | SYSTOLIC BLOOD PRESSURE: 112 MMHG | WEIGHT: 153.4 LBS | HEIGHT: 61 IN

## 2023-01-19 DIAGNOSIS — R51.9 OCCIPITAL PAIN: ICD-10-CM

## 2023-01-19 DIAGNOSIS — Z13.220 LIPID SCREENING: ICD-10-CM

## 2023-01-19 DIAGNOSIS — R25.2 LEG CRAMPS: ICD-10-CM

## 2023-01-19 DIAGNOSIS — Z00.00 ROUTINE GENERAL MEDICAL EXAMINATION AT A HEALTH CARE FACILITY: Primary | ICD-10-CM

## 2023-01-19 DIAGNOSIS — Z12.31 VISIT FOR SCREENING MAMMOGRAM: ICD-10-CM

## 2023-01-19 DIAGNOSIS — E04.1 THYROID NODULE: ICD-10-CM

## 2023-01-19 DIAGNOSIS — Z13.1 SCREENING FOR DIABETES MELLITUS: ICD-10-CM

## 2023-01-19 DIAGNOSIS — N95.1 VAGINAL DRYNESS, MENOPAUSAL: ICD-10-CM

## 2023-01-19 DIAGNOSIS — Z13.0 SCREENING FOR DEFICIENCY ANEMIA: ICD-10-CM

## 2023-01-19 LAB
ALBUMIN SERPL BCG-MCNC: 4.7 G/DL (ref 3.5–5.2)
ALP SERPL-CCNC: 79 U/L (ref 35–104)
ALT SERPL W P-5'-P-CCNC: 26 U/L (ref 10–35)
ANION GAP SERPL CALCULATED.3IONS-SCNC: 16 MMOL/L (ref 7–15)
AST SERPL W P-5'-P-CCNC: 29 U/L (ref 10–35)
BILIRUB SERPL-MCNC: 0.8 MG/DL
BUN SERPL-MCNC: 11.9 MG/DL (ref 8–23)
CALCIUM SERPL-MCNC: 9.8 MG/DL (ref 8.8–10.2)
CHLORIDE SERPL-SCNC: 105 MMOL/L (ref 98–107)
CHOLEST SERPL-MCNC: 223 MG/DL
CREAT SERPL-MCNC: 0.9 MG/DL (ref 0.51–0.95)
DEPRECATED HCO3 PLAS-SCNC: 23 MMOL/L (ref 22–29)
ERYTHROCYTE [DISTWIDTH] IN BLOOD BY AUTOMATED COUNT: 13 % (ref 10–15)
FERRITIN SERPL-MCNC: 305 NG/ML (ref 11–328)
GFR SERPL CREATININE-BSD FRML MDRD: 72 ML/MIN/1.73M2
GLUCOSE SERPL-MCNC: 92 MG/DL (ref 70–99)
HCT VFR BLD AUTO: 42.7 % (ref 35–47)
HDLC SERPL-MCNC: 65 MG/DL
HGB BLD-MCNC: 14.4 G/DL (ref 11.7–15.7)
LDLC SERPL CALC-MCNC: 140 MG/DL
MAGNESIUM SERPL-MCNC: 2.5 MG/DL (ref 1.7–2.3)
MCH RBC QN AUTO: 29.6 PG (ref 26.5–33)
MCHC RBC AUTO-ENTMCNC: 33.7 G/DL (ref 31.5–36.5)
MCV RBC AUTO: 88 FL (ref 78–100)
NONHDLC SERPL-MCNC: 158 MG/DL
PLATELET # BLD AUTO: 226 10E3/UL (ref 150–450)
POTASSIUM SERPL-SCNC: 5.1 MMOL/L (ref 3.4–5.3)
PROT SERPL-MCNC: 7.3 G/DL (ref 6.4–8.3)
RBC # BLD AUTO: 4.86 10E6/UL (ref 3.8–5.2)
SODIUM SERPL-SCNC: 144 MMOL/L (ref 136–145)
TRIGL SERPL-MCNC: 92 MG/DL
TSH SERPL DL<=0.005 MIU/L-ACNC: 2.28 UIU/ML (ref 0.3–4.2)
WBC # BLD AUTO: 4.5 10E3/UL (ref 4–11)

## 2023-01-19 PROCEDURE — 80061 LIPID PANEL: CPT | Performed by: PHYSICIAN ASSISTANT

## 2023-01-19 PROCEDURE — 85027 COMPLETE CBC AUTOMATED: CPT | Performed by: PHYSICIAN ASSISTANT

## 2023-01-19 PROCEDURE — 36415 COLL VENOUS BLD VENIPUNCTURE: CPT | Performed by: PHYSICIAN ASSISTANT

## 2023-01-19 PROCEDURE — 99214 OFFICE O/P EST MOD 30 MIN: CPT | Mod: 25 | Performed by: PHYSICIAN ASSISTANT

## 2023-01-19 PROCEDURE — 99396 PREV VISIT EST AGE 40-64: CPT | Performed by: PHYSICIAN ASSISTANT

## 2023-01-19 PROCEDURE — 83735 ASSAY OF MAGNESIUM: CPT | Performed by: PHYSICIAN ASSISTANT

## 2023-01-19 PROCEDURE — 82728 ASSAY OF FERRITIN: CPT | Performed by: PHYSICIAN ASSISTANT

## 2023-01-19 PROCEDURE — 80053 COMPREHEN METABOLIC PANEL: CPT | Performed by: PHYSICIAN ASSISTANT

## 2023-01-19 PROCEDURE — 84443 ASSAY THYROID STIM HORMONE: CPT | Performed by: PHYSICIAN ASSISTANT

## 2023-01-19 RX ORDER — ESTRADIOL 10 UG/1
10 INSERT VAGINAL
Qty: 8 TABLET | Refills: 1 | Status: SHIPPED | OUTPATIENT
Start: 2023-01-19

## 2023-01-19 RX ORDER — GLYCERIN/MIN OIL/POLYCARBOPHIL
1 GEL WITH APPLICATOR (GRAM) VAGINAL
Start: 2023-01-20

## 2023-01-19 ASSESSMENT — ENCOUNTER SYMPTOMS
SHORTNESS OF BREATH: 0
DIARRHEA: 0
COUGH: 0
PARESTHESIAS: 0
ARTHRALGIAS: 0
PALPITATIONS: 0
MYALGIAS: 0
JOINT SWELLING: 0
HEADACHES: 0
ABDOMINAL PAIN: 0
DYSURIA: 0
CHILLS: 0
HEARTBURN: 0
HEMATURIA: 0
EYE PAIN: 0
FEVER: 0
FREQUENCY: 0
NERVOUS/ANXIOUS: 0
BREAST MASS: 0
HEMATOCHEZIA: 0
NAUSEA: 0
CONSTIPATION: 0
WEAKNESS: 0
SORE THROAT: 0
DIZZINESS: 0

## 2023-01-19 NOTE — PROGRESS NOTES
"   SUBJECTIVE:   CC: Tawana is an 62 year old who presents for preventive health visit.   Patient has been advised of split billing requirements and indicates understanding: Yes  Healthy Habits:     Getting at least 3 servings of Calcium per day:  Yes    Bi-annual eye exam:  Yes    Dental care twice a year:  Yes    Sleep apnea or symptoms of sleep apnea:  None    Diet:  Regular (no restrictions)    Frequency of exercise:  2-3 days/week    Duration of exercise:  45-60 minutes    Taking medications regularly:  Not Applicable    Medication side effects:  Not applicable    PHQ-2 Total Score: 0    Additional concerns today:  Yes      Sessile colon polyp  Patient had a colonoscopy in 12/4/2020 at [place].  She did have one sessile colon polyp that was 4 mm as well as a tubular adenoma.  Recommend 5-year follow-up     Painful intercourse  Patient notes that over the course of the summer in the last year that intercourse has been painful despite lubrication.  She is status post a supracervical hysterectomy but her ovaries remain.  She is not experiencing any other symptoms of menopause that she is aware of.     Family History of Pancreatic Cancer - The patient has a family history of pancreatic cancer in her mother that started at age 67. She had a cancer antigen 19-9 completed on 12/8/2016 which was normal. Ultrasound was ordered for screening 4/2018 completed at [facility] in [place] and was normal.     Family history of Thyroid disorder/ Goiter  Thyroid diffusely enlarged at last physical last year.  TSH normal. Sister recently was found to have thyroid cancer at age 63.  She had a thyroid ultrasound in January 2020 that showed a nodule that was subsequently biopsied and consistent with a colloid benign nodule.  Recommend repeat ultrasound this year.    Leg cramps  Happening lately in the morning.  Has not tried anything.      Head trauma/head pain  Fell and hit hell this summer - June on concrete.  \"head bounced\".  No LOC.  " Otherwise felt okay.    Was fine the next day.  Pinpoint pain left occiput since then lasting about a minute.  Intense pain.  Applying pressure helps.  Last 2 tiimes with orgasm (no intercourse since Nov), when about to vomit during covid in November.  Exercise during crunches (1st occurrence) in the fall.      Left breast BB  7 o'clock near [place].  Noticed this fall again.  No pain, noticed while bathing.  No fam hx of breast cancer.  Last mammo 2020 @ [facility].  Hx of diagnostic mammo & US 2014 in same area c/w 0.4 x 0.2 cm intradermal cystic lesion, consistent with a benign inflammatory process such as an epidermoid or inclusion cyst. No suspicious findings.        Today's PHQ-2 Score:   PHQ-2 (  [employer]) 2023   Q1: Little interest or pleasure in doing things 0   Q2: Feeling down, depressed or hopeless 0   PHQ-2 Score 0   PHQ-2 Total Score (12-17 Years)- Positive if 3 or more points; Administer PHQ-A if positive -   Q1: Little interest or pleasure in doing things Not at all   Q2: Feeling down, depressed or hopeless Not at all   PHQ-2 Score 0           Social History     Tobacco Use     Smoking status: Former     Packs/day: 1.50     Years: 10.00     Pack years: 15.00     Types: Cigarettes     Quit date: 1/15/1986     Years since quittin.0     Smokeless tobacco: Never   Substance Use Topics     Alcohol use: Yes     Alcohol/week: 3.0 - 4.0 standard drinks     Types: 3 - 4 Cans of beer per week     Comment: 3-4 beers weekly     If you drink alcohol do you typically have >3 drinks per day or >7 drinks per week? No    Alcohol Use 2023   Prescreen: >3 drinks/day or >7 drinks/week? -   Prescreen: >3 drinks/day or >7 drinks/week? No       Reviewed orders with patient.  Reviewed health maintenance and updated orders accordingly - Yes  BP Readings from Last 3 Encounters:   23 112/66   20 118/64   19 110/74    Wt Readings from Last 3 Encounters:   23 69.6 kg (153 lb 6.4 oz)    20 62.6 kg (138 lb)   19 64.9 kg (143 lb)                  Patient Active Problem List   Diagnosis     Diverticulosis of colon     Advanced directives, counseling/discussion     Goiter     Family history of pancreatic cancer - mother age 67     Thyroid nodule - biopsy 2020 showed colloid nodule - due to family history of thyroid cancer - plan for repeat thyroid ultrasound      Sessile colonic polyp - 2020 - repeat colonoscopy 2025     Past Surgical History:   Procedure Laterality Date     CARPAL TUNNEL RELEASE RT/LT Right       SECTION  87, 90     COLONOSCOPY  2010    no polyps repeat 10     COLONOSCOPY  2020    sessile serrated adenoma - repeat 5 years     HYSTERECTOMY, SUPRACERVICAL LAPAROSCOPIC  2008    fibroid - ovaries spared     MAMMOPLASTY REDUCTION  2006     PHLEBECTOMY MULTIPLE STAB Left 10/15/1996    varicosity - painful     SURGICAL HISTORY OF -   2000    Esophagram - difficulty swallowing     TUBAL LIGATION  1990    s/p TL       Social History     Tobacco Use     Smoking status: Former     Packs/day: 1.50     Years: 10.00     Pack years: 15.00     Types: Cigarettes     Quit date: 1/15/1986     Years since quittin.0     Smokeless tobacco: Never   Substance Use Topics     Alcohol use: Yes     Alcohol/week: 3.0 - 4.0 standard drinks     Types: 3 - 4 Cans of beer per week     Comment: 3-4 beers weekly     Family History   Problem Relation Age of Onset     Pancreatic Cancer Mother         pancreatic, +67     Hypertension Mother      Diabetes Father          at 40     No Known Problems Brother      Thyroid Cancer Sister 63     No Known Problems Brother      No Known Problems Brother      No Known Problems Brother      No Known Problems Brother      No Known Problems Brother      Breast Cancer No family hx of      Cancer - colorectal No family hx of      C.A.D. No family hx of          Current Outpatient Medications    Medication Sig Dispense Refill     estradiol (VAGIFEM) 10 MCG TABS vaginal tablet Place 1 tablet (10 mcg) vaginally twice a week 8 tablet 1     Vaginal Lubricant (REPLENS) GEL Place 1 Applicatorful vaginally three times a week         Breast Cancer Screening:    Breast CA Risk Assessment (FHS-7) 1/16/2023   Do you have a family history of breast, colon, or ovarian cancer? No / Unknown         Mammogram Screening: Recommended mammography every 1-2 years with patient discussion and risk factor consideration  Pertinent mammograms are reviewed under the imaging tab.    History of abnormal Pap smear: NO - age 30-65 PAP every 5 years with negative HPV co-testing recommended  PAP / HPV Latest Ref Rng & Units 12/17/2020 12/3/2015 11/2/2011   PAP (Historical) - NIL NIL NIL   HPV16 NEG:Negative Negative Negative -   HPV18 NEG:Negative Negative Negative -   HRHPV NEG:Negative Negative Negative -     Reviewed and updated as needed this visit by clinical staff   Tobacco  Allergies  Meds              Reviewed and updated as needed this visit by Provider                     Review of Systems   Constitutional: Negative for chills and fever.   HENT: Negative for congestion, ear pain, hearing loss and sore throat.    Eyes: Negative for pain and visual disturbance.   Respiratory: Negative for cough and shortness of breath.    Cardiovascular: Negative for chest pain, palpitations and peripheral edema.   Gastrointestinal: Negative for abdominal pain, constipation, diarrhea, heartburn, hematochezia and nausea.   Breasts:  Negative for tenderness, breast mass and discharge.   Genitourinary: Negative for dysuria, frequency, genital sores, hematuria, pelvic pain, urgency, vaginal bleeding and vaginal discharge.   Musculoskeletal: Negative for arthralgias, joint swelling and myalgias.   Skin: Negative for rash.   Neurological: Negative for dizziness, weakness, headaches and paresthesias.   Psychiatric/Behavioral: Negative for mood  "changes. The patient is not nervous/anxious.         OBJECTIVE:   /66   Pulse 71   Temp (!) 96.3  F (35.7  C) (Tympanic)   Resp 18   Ht 1.544 m (5' 0.79\")   Wt 69.6 kg (153 lb 6.4 oz)   LMP 11/01/2008   SpO2 98%   BMI 29.19 kg/m    Physical Exam  GENERAL: healthy, alert and no distress  EYES: Eyes grossly normal to inspection, PERRL and conjunctivae and sclerae normal  HENT: ear canals and TM's normal, nose and mouth without ulcers or lesions  NECK: no adenopathy, no asymmetry, masses, or scars and thyroid normal to palpation  RESP: lungs clear to auscultation - no rales, rhonchi or wheezes  BREAST: no tenderness or nipple discharge, no palpable axillary masses or adenopathy and mass left breast 7:00 - size of a BB  CV: regular rate and rhythm, normal S1 S2, no S3 or S4, no murmur, click or rub, no peripheral edema and peripheral pulses strong  ABDOMEN: soft, nontender, no hepatosplenomegaly, no masses and bowel sounds normal   (female): normal female external genitalia, normal urethral meatus, vaginal mucosa pink, moist, well rugated, and normal cervix/adnexa/uterus without masses or discharge  MS: no gross musculoskeletal defects noted, no edema  SKIN: no suspicious lesions or rashes  NEURO: cranial nerves II-XII grossly intact, point to point motions intact, RRM intact, able to heel toe walk, able to hop on one foot, and negative Romberg   PSYCH: mentation appears normal, affect normal/bright    Diagnostic Test Results:  Results for orders placed or performed in visit on 01/19/23   TSH with free T4 reflex     Status: Normal   Result Value Ref Range    TSH 2.28 0.30 - 4.20 uIU/mL   Lipid panel reflex to direct LDL Fasting     Status: Abnormal   Result Value Ref Range    Cholesterol 223 (H) <200 mg/dL    Triglycerides 92 <150 mg/dL    Direct Measure HDL 65 >=50 mg/dL    LDL Cholesterol Calculated 140 (H) <=100 mg/dL    Non HDL Cholesterol 158 (H) <130 mg/dL    Narrative    Cholesterol  Desirable:  " <200 mg/dL    Triglycerides  Normal:  Less than 150 mg/dL  Borderline High:  150-199 mg/dL  High:  200-499 mg/dL  Very High:  Greater than or equal to 500 mg/dL    Direct Measure HDL  Female:  Greater than or equal to 50 mg/dL   Male:  Greater than or equal to 40 mg/dL    LDL Cholesterol  Desirable:  <100mg/dL  Above Desirable:  100-129 mg/dL   Borderline High:  130-159 mg/dL   High:  160-189 mg/dL   Very High:  >= 190 mg/dL    Non HDL Cholesterol  Desirable:  130 mg/dL  Above Desirable:  130-159 mg/dL  Borderline High:  160-189 mg/dL  High:  190-219 mg/dL  Very High:  Greater than or equal to 220 mg/dL   Comprehensive metabolic panel (BMP + Alb, Alk Phos, ALT, AST, Total. Bili, TP)     Status: Abnormal   Result Value Ref Range    Sodium 144 136 - 145 mmol/L    Potassium 5.1 3.4 - 5.3 mmol/L    Chloride 105 98 - 107 mmol/L    Carbon Dioxide (CO2) 23 22 - 29 mmol/L    Anion Gap 16 (H) 7 - 15 mmol/L    Urea Nitrogen 11.9 8.0 - 23.0 mg/dL    Creatinine 0.90 0.51 - 0.95 mg/dL    Calcium 9.8 8.8 - 10.2 mg/dL    Glucose 92 70 - 99 mg/dL    Alkaline Phosphatase 79 35 - 104 U/L    AST 29 10 - 35 U/L    ALT 26 10 - 35 U/L    Protein Total 7.3 6.4 - 8.3 g/dL    Albumin 4.7 3.5 - 5.2 g/dL    Bilirubin Total 0.8 <=1.2 mg/dL    GFR Estimate 72 >60 mL/min/1.73m2   CBC with platelets     Status: Normal   Result Value Ref Range    WBC Count 4.5 4.0 - 11.0 10e3/uL    RBC Count 4.86 3.80 - 5.20 10e6/uL    Hemoglobin 14.4 11.7 - 15.7 g/dL    Hematocrit 42.7 35.0 - 47.0 %    MCV 88 78 - 100 fL    MCH 29.6 26.5 - 33.0 pg    MCHC 33.7 31.5 - 36.5 g/dL    RDW 13.0 10.0 - 15.0 %    Platelet Count 226 150 - 450 10e3/uL   Ferritin     Status: Normal   Result Value Ref Range    Ferritin 305 11 - 328 ng/mL   Magnesium     Status: Abnormal   Result Value Ref Range    Magnesium 2.5 (H) 1.7 - 2.3 mg/dL       ASSESSMENT/PLAN:   Tawana was seen today for physical.    Diagnoses and all orders for this visit:    Routine general medical examination at  a health care facility  -     REVIEW OF HEALTH MAINTENANCE PROTOCOL ORDERS    Thyroid nodule - biopsy 1/24/2020 showed colloid nodule - due to family history of thyroid cancer - plan for repeat thyroid ultrasound 2021  Overdue for US.  Ordered today.  Thyroid function will also be checked.  -     US Thyroid; Future  -     TSH with free T4 reflex    Occipital pain  Self limiting localized pain that appears to correlate with increased intracranial pressure after head trauma.  Pt concerned that it is persisting.  MRI to r/o intracranial pathology.  -     MR Brain w/o & w Contrast; Future    Leg cramps  Unclear etiology.  Labs for further evaluation.  -     Ferritin  -     Magnesium    Lipid screening  Screening for diabetes mellitus  Screening for deficiency anemia  Visit for screening mammogram  Routine screening  -     Lipid panel reflex to direct LDL Fasting  -     Comprehensive metabolic panel (BMP + Alb, Alk Phos, ALT, AST, Total. Bili, TP)  -     CBC with platelets  -     MA Screen Bilateral w/Pedrito; Future    Vaginal dryness, menopausal  Trial of replens.  If not effective, recommend trial of vaginal estradiol tablets.  Lubrication encouraged.  -     Vaginal Lubricant (REPLENS) GEL; Place 1 Applicatorful vaginally three times a week  -     estradiol (VAGIFEM) 10 MCG TABS vaginal tablet; Place 1 tablet (10 mcg) vaginally twice a week        Patient has been advised of split billing requirements and indicates understanding: Yes      COUNSELING:  Reviewed preventive health counseling, as reflected in patient instructions       Regular exercise       Healthy diet/nutrition        She reports that she quit smoking about 37 years ago. Her smoking use included cigarettes. She has a 15.00 pack-year smoking history. She has never used smokeless tobacco.      Oksana Rubalcava PA-C  [facility] [place]

## 2023-01-30 ENCOUNTER — TRANSFERRED RECORDS (OUTPATIENT)
Dept: HEALTH INFORMATION MANAGEMENT | Facility: CLINIC | Age: 63
End: 2023-01-30
Payer: COMMERCIAL

## 2023-06-01 ENCOUNTER — HEALTH MAINTENANCE LETTER (OUTPATIENT)
Age: 63
End: 2023-06-01

## 2023-12-20 ENCOUNTER — VIRTUAL VISIT (OUTPATIENT)
Dept: FAMILY MEDICINE | Facility: CLINIC | Age: 63
End: 2023-12-20
Payer: COMMERCIAL

## 2023-12-20 DIAGNOSIS — J20.9 ACUTE BRONCHITIS WITH SYMPTOMS > 10 DAYS: Primary | ICD-10-CM

## 2023-12-20 PROCEDURE — 99213 OFFICE O/P EST LOW 20 MIN: CPT | Mod: VID | Performed by: FAMILY MEDICINE

## 2023-12-20 RX ORDER — AZITHROMYCIN 250 MG/1
TABLET, FILM COATED ORAL
Qty: 6 TABLET | Refills: 0 | Status: SHIPPED | OUTPATIENT
Start: 2023-12-20 | End: 2023-12-25

## 2023-12-20 ASSESSMENT — ENCOUNTER SYMPTOMS: COUGH: 1

## 2023-12-20 NOTE — PATIENT INSTRUCTIONS
Try using Flonase (over-the-counter nasal steroid spray) to help relieve the ear pressure.  It is safe to use this indefinitely and it will take several days of regular use to start having an effect.

## 2023-12-20 NOTE — PROGRESS NOTES
Tawana is a 63 year old who is being evaluated via a billable video visit.      How would you like to obtain your AVS? MyChart  If the video visit is dropped, the invitation should be resent by: Text to cell phone: [phone]  Will anyone else be joining your video visit? No          Assessment & Plan     Acute bronchitis with symptoms > 10 days  Chest symptoms not improving after 10 days of illness.  I did offer her a zpak and instructed her on use as well as R/B/SE.  I also recommended she do a nasal steroid spray to help open the eustachian tubes to relieve her ear symptoms.  - azithromycin (ZITHROMAX) 250 MG tablet; Take 2 tablets (500 mg) by mouth daily for 1 day, THEN 1 tablet (250 mg) daily for 4 days.    Patient Instructions   Try using Flonase (over-the-counter nasal steroid spray) to help relieve the ear pressure.  It is safe to use this indefinitely and it will take several days of regular use to start having an effect.    Return for recheck as needed if symptoms persist or worsen.     Krystyna Olivares MD  [facility]   Tawana is a 63 year old, presenting for the following health issues:  Cough (Cough for the last week )      12/20/2023     9:12 AM   Additional Questions   Roomed by Shoshana Coelho       Cough    History of Present Illness       Reason for visit:  Cough  Symptom onset:  1-3 days ago  Symptoms include:  Cough  Symptom intensity:  Moderate  Symptom progression:  Worsening  Had these symptoms before:  No  What makes it worse:  NA  What makes it better:  NA    She eats 2-3 servings of fruits and vegetables daily.She consumes 0 sweetened beverage(s) daily.She exercises with enough effort to increase her heart rate 9 or less minutes per day.  She exercises with enough effort to increase her heart rate 3 or less days per week.   She is taking medications regularly.     Illness x 10 days  Started with sore throat, then ear pain, headache, fatigue and  cough  She had been with her grandson who had pinkeye.  Eyes became matted and goopy 5 days ago   She went to an  and was given erythro ointment.  I reviewed the documentation in Care Everywhere from this visit 4 days ago.  Strangely they did not listen to her lungs even though she reported cough.  They did not test for COVID of influenza but she did a home COVID test that was negative.  Eye symptoms have improved but cough persists.  Some dyspnea on exertion but no shortness of breath at rest.  Cough is dry/nonproductive.  Has had chills at night but hasn't taken her temperature.  Not improving.  Feels exhausted.  She did 3 days of Afrin but still feels her ears are plugged. Ear exam was documented as normal at urgent care visit 4 days ago  Still doing Robitussin DM      Review of Systems   Respiratory:  Positive for cough.             Objective           Vitals:  No vitals were obtained today due to virtual visit.    Physical Exam   GENERAL: Healthy, alert and no distress  EYES: Eyes grossly normal to inspection.  No discharge or erythema, or obvious scleral/conjunctival abnormalities.  RESP: She coughs during out exam.  No audible wheeze or visible cyanosis.  No visible retractions or increased work of breathing.    SKIN: Visible skin clear. No significant rash, abnormal pigmentation or lesions.  NEURO: Cranial nerves grossly intact.  Mentation and speech appropriate for age.  PSYCH: Mentation appears normal, affect normal/bright, judgement and insight intact, normal speech and appearance well-groomed.        Video-Visit Details    Type of service:  Video Visit   10:05 AM  10:20 AM  Originating Location (pt. Location): Home    Distant Location (provider location):  Off-site  Platform used for Video Visit: [employer]

## 2023-12-26 ENCOUNTER — MYC MEDICAL ADVICE (OUTPATIENT)
Dept: FAMILY MEDICINE | Facility: CLINIC | Age: 63
End: 2023-12-26
Payer: COMMERCIAL

## 2023-12-26 NOTE — TELEPHONE ENCOUNTER
See RN response to patient's mychart message re:flonase use, antibx    CHON YinN RN  [facility]

## 2024-01-04 ENCOUNTER — PATIENT OUTREACH (OUTPATIENT)
Dept: CARE COORDINATION | Facility: CLINIC | Age: 64
End: 2024-01-04
Payer: COMMERCIAL

## 2024-01-09 ENCOUNTER — PATIENT OUTREACH (OUTPATIENT)
Dept: CARE COORDINATION | Facility: CLINIC | Age: 64
End: 2024-01-09
Payer: COMMERCIAL

## 2024-01-18 ENCOUNTER — PATIENT OUTREACH (OUTPATIENT)
Dept: CARE COORDINATION | Facility: CLINIC | Age: 64
End: 2024-01-18
Payer: COMMERCIAL

## 2024-02-06 ENCOUNTER — PATIENT OUTREACH (OUTPATIENT)
Dept: CARE COORDINATION | Facility: CLINIC | Age: 64
End: 2024-02-06
Payer: COMMERCIAL

## 2024-05-26 ENCOUNTER — HEALTH MAINTENANCE LETTER (OUTPATIENT)
Age: 64
End: 2024-05-26

## 2025-03-22 ENCOUNTER — HEALTH MAINTENANCE LETTER (OUTPATIENT)
Age: 65
End: 2025-03-22